# Patient Record
Sex: MALE | Race: WHITE | NOT HISPANIC OR LATINO | Employment: OTHER | URBAN - METROPOLITAN AREA
[De-identification: names, ages, dates, MRNs, and addresses within clinical notes are randomized per-mention and may not be internally consistent; named-entity substitution may affect disease eponyms.]

---

## 2024-07-09 ENCOUNTER — APPOINTMENT (OUTPATIENT)
Dept: GENERAL RADIOLOGY | Facility: HOSPITAL | Age: 49
End: 2024-07-09
Payer: COMMERCIAL

## 2024-07-09 ENCOUNTER — HOSPITAL ENCOUNTER (OUTPATIENT)
Facility: HOSPITAL | Age: 49
Setting detail: OBSERVATION
Discharge: HOME | End: 2024-07-10
Attending: EMERGENCY MEDICINE | Admitting: EMERGENCY MEDICINE
Payer: COMMERCIAL

## 2024-07-09 DIAGNOSIS — R79.82 ELEVATED C-REACTIVE PROTEIN (CRP): ICD-10-CM

## 2024-07-09 DIAGNOSIS — E11.9 TYPE 2 DIABETES MELLITUS WITHOUT COMPLICATION, UNSPECIFIED WHETHER LONG TERM INSULIN USE: ICD-10-CM

## 2024-07-09 DIAGNOSIS — D72.829 LEUKOCYTOSIS, UNSPECIFIED TYPE: ICD-10-CM

## 2024-07-09 DIAGNOSIS — M71.50 TRAUMATIC BURSITIS: Primary | ICD-10-CM

## 2024-07-09 LAB
ALBUMIN SERPL-MCNC: 4.3 G/DL (ref 3.5–5.2)
ALBUMIN/GLOB SERPL: 1.5 G/DL
ALP SERPL-CCNC: 70 U/L (ref 39–117)
ALT SERPL W P-5'-P-CCNC: 14 U/L (ref 1–41)
ANION GAP SERPL CALCULATED.3IONS-SCNC: 12.3 MMOL/L (ref 5–15)
AST SERPL-CCNC: 15 U/L (ref 1–40)
BASOPHILS # BLD AUTO: 0.03 10*3/MM3 (ref 0–0.2)
BASOPHILS NFR BLD AUTO: 0.3 % (ref 0–1.5)
BILIRUB SERPL-MCNC: 1.4 MG/DL (ref 0–1.2)
BUN SERPL-MCNC: 18 MG/DL (ref 6–20)
BUN/CREAT SERPL: 25 (ref 7–25)
CALCIUM SPEC-SCNC: 8.9 MG/DL (ref 8.6–10.5)
CHLORIDE SERPL-SCNC: 101 MMOL/L (ref 98–107)
CO2 SERPL-SCNC: 22.7 MMOL/L (ref 22–29)
CREAT SERPL-MCNC: 0.72 MG/DL (ref 0.76–1.27)
CRP SERPL-MCNC: 0.84 MG/DL (ref 0–0.5)
D-LACTATE SERPL-SCNC: 1.3 MMOL/L (ref 0.5–2)
DEPRECATED RDW RBC AUTO: 40.6 FL (ref 37–54)
EGFRCR SERPLBLD CKD-EPI 2021: 113.4 ML/MIN/1.73
EOSINOPHIL # BLD AUTO: 0.18 10*3/MM3 (ref 0–0.4)
EOSINOPHIL NFR BLD AUTO: 1.5 % (ref 0.3–6.2)
ERYTHROCYTE [DISTWIDTH] IN BLOOD BY AUTOMATED COUNT: 12.3 % (ref 12.3–15.4)
ERYTHROCYTE [SEDIMENTATION RATE] IN BLOOD: 6 MM/HR (ref 0–15)
GLOBULIN UR ELPH-MCNC: 2.8 GM/DL
GLUCOSE SERPL-MCNC: 121 MG/DL (ref 65–99)
HCT VFR BLD AUTO: 41.7 % (ref 37.5–51)
HGB BLD-MCNC: 14.4 G/DL (ref 13–17.7)
IMM GRANULOCYTES # BLD AUTO: 0.02 10*3/MM3 (ref 0–0.05)
IMM GRANULOCYTES NFR BLD AUTO: 0.2 % (ref 0–0.5)
LYMPHOCYTES # BLD AUTO: 1.47 10*3/MM3 (ref 0.7–3.1)
LYMPHOCYTES NFR BLD AUTO: 12.6 % (ref 19.6–45.3)
MCH RBC QN AUTO: 31.7 PG (ref 26.6–33)
MCHC RBC AUTO-ENTMCNC: 34.5 G/DL (ref 31.5–35.7)
MCV RBC AUTO: 91.9 FL (ref 79–97)
MONOCYTES # BLD AUTO: 0.78 10*3/MM3 (ref 0.1–0.9)
MONOCYTES NFR BLD AUTO: 6.7 % (ref 5–12)
NEUTROPHILS NFR BLD AUTO: 78.7 % (ref 42.7–76)
NEUTROPHILS NFR BLD AUTO: 9.2 10*3/MM3 (ref 1.7–7)
NRBC BLD AUTO-RTO: 0 /100 WBC (ref 0–0.2)
PLATELET # BLD AUTO: 251 10*3/MM3 (ref 140–450)
PMV BLD AUTO: 8.9 FL (ref 6–12)
POTASSIUM SERPL-SCNC: 3.9 MMOL/L (ref 3.5–5.2)
PROCALCITONIN SERPL-MCNC: 0.04 NG/ML (ref 0–0.25)
PROT SERPL-MCNC: 7.1 G/DL (ref 6–8.5)
RBC # BLD AUTO: 4.54 10*6/MM3 (ref 4.14–5.8)
SODIUM SERPL-SCNC: 136 MMOL/L (ref 136–145)
WBC NRBC COR # BLD AUTO: 11.68 10*3/MM3 (ref 3.4–10.8)

## 2024-07-09 PROCEDURE — 96375 TX/PRO/DX INJ NEW DRUG ADDON: CPT

## 2024-07-09 PROCEDURE — 25010000002 ONDANSETRON PER 1 MG: Performed by: EMERGENCY MEDICINE

## 2024-07-09 PROCEDURE — 25010000002 MORPHINE PER 10 MG: Performed by: EMERGENCY MEDICINE

## 2024-07-09 PROCEDURE — 85652 RBC SED RATE AUTOMATED: CPT | Performed by: EMERGENCY MEDICINE

## 2024-07-09 PROCEDURE — 99285 EMERGENCY DEPT VISIT HI MDM: CPT

## 2024-07-09 PROCEDURE — 84550 ASSAY OF BLOOD/URIC ACID: CPT | Performed by: NURSE PRACTITIONER

## 2024-07-09 PROCEDURE — 83605 ASSAY OF LACTIC ACID: CPT | Performed by: EMERGENCY MEDICINE

## 2024-07-09 PROCEDURE — 86140 C-REACTIVE PROTEIN: CPT | Performed by: EMERGENCY MEDICINE

## 2024-07-09 PROCEDURE — 96365 THER/PROPH/DIAG IV INF INIT: CPT

## 2024-07-09 PROCEDURE — 84145 PROCALCITONIN (PCT): CPT | Performed by: EMERGENCY MEDICINE

## 2024-07-09 PROCEDURE — 73070 X-RAY EXAM OF ELBOW: CPT

## 2024-07-09 PROCEDURE — 85025 COMPLETE CBC W/AUTO DIFF WBC: CPT | Performed by: EMERGENCY MEDICINE

## 2024-07-09 PROCEDURE — 25010000002 AMPICILLIN-SULBACTAM PER 1.5 G: Performed by: EMERGENCY MEDICINE

## 2024-07-09 PROCEDURE — 25010000002 KETOROLAC TROMETHAMINE PER 15 MG: Performed by: EMERGENCY MEDICINE

## 2024-07-09 PROCEDURE — 80053 COMPREHEN METABOLIC PANEL: CPT | Performed by: EMERGENCY MEDICINE

## 2024-07-09 RX ORDER — ONDANSETRON 2 MG/ML
4 INJECTION INTRAMUSCULAR; INTRAVENOUS ONCE
Status: COMPLETED | OUTPATIENT
Start: 2024-07-09 | End: 2024-07-09

## 2024-07-09 RX ORDER — KETOROLAC TROMETHAMINE 15 MG/ML
15 INJECTION, SOLUTION INTRAMUSCULAR; INTRAVENOUS ONCE
Status: COMPLETED | OUTPATIENT
Start: 2024-07-09 | End: 2024-07-09

## 2024-07-09 RX ORDER — MORPHINE SULFATE 2 MG/ML
4 INJECTION, SOLUTION INTRAMUSCULAR; INTRAVENOUS ONCE
Status: COMPLETED | OUTPATIENT
Start: 2024-07-09 | End: 2024-07-09

## 2024-07-09 RX ADMIN — MORPHINE SULFATE 4 MG: 2 INJECTION, SOLUTION INTRAMUSCULAR; INTRAVENOUS at 23:26

## 2024-07-09 RX ADMIN — ONDANSETRON 4 MG: 2 INJECTION INTRAMUSCULAR; INTRAVENOUS at 23:27

## 2024-07-09 RX ADMIN — AMPICILLIN SODIUM AND SULBACTAM SODIUM 3 G: 2; 1 INJECTION, POWDER, FOR SOLUTION INTRAMUSCULAR; INTRAVENOUS at 23:27

## 2024-07-09 RX ADMIN — KETOROLAC TROMETHAMINE 15 MG: 15 INJECTION, SOLUTION INTRAMUSCULAR; INTRAVENOUS at 23:26

## 2024-07-10 VITALS
SYSTOLIC BLOOD PRESSURE: 127 MMHG | HEART RATE: 69 BPM | DIASTOLIC BLOOD PRESSURE: 73 MMHG | OXYGEN SATURATION: 94 % | TEMPERATURE: 98.4 F | WEIGHT: 247.7 LBS | BODY MASS INDEX: 32.83 KG/M2 | HEIGHT: 73 IN | RESPIRATION RATE: 18 BRPM

## 2024-07-10 PROBLEM — M25.522 LEFT ELBOW PAIN: Status: ACTIVE | Noted: 2024-07-10

## 2024-07-10 LAB
GLUCOSE BLDC GLUCOMTR-MCNC: 165 MG/DL (ref 70–130)
URATE SERPL-MCNC: 3.4 MG/DL (ref 3.4–7)

## 2024-07-10 PROCEDURE — 96366 THER/PROPH/DIAG IV INF ADDON: CPT

## 2024-07-10 PROCEDURE — 82948 REAGENT STRIP/BLOOD GLUCOSE: CPT

## 2024-07-10 PROCEDURE — G0378 HOSPITAL OBSERVATION PER HR: HCPCS

## 2024-07-10 PROCEDURE — 63710000001 INSULIN LISPRO (HUMAN) PER 5 UNITS: Performed by: STUDENT IN AN ORGANIZED HEALTH CARE EDUCATION/TRAINING PROGRAM

## 2024-07-10 PROCEDURE — 25010000002 AMPICILLIN-SULBACTAM PER 1.5 G: Performed by: EMERGENCY MEDICINE

## 2024-07-10 RX ORDER — SODIUM CHLORIDE 0.9 % (FLUSH) 0.9 %
10 SYRINGE (ML) INJECTION EVERY 12 HOURS SCHEDULED
Status: DISCONTINUED | OUTPATIENT
Start: 2024-07-10 | End: 2024-07-10 | Stop reason: HOSPADM

## 2024-07-10 RX ORDER — SODIUM CHLORIDE 9 MG/ML
40 INJECTION, SOLUTION INTRAVENOUS AS NEEDED
Status: DISCONTINUED | OUTPATIENT
Start: 2024-07-10 | End: 2024-07-10 | Stop reason: HOSPADM

## 2024-07-10 RX ORDER — INSULIN LISPRO 100 [IU]/ML
2-9 INJECTION, SOLUTION INTRAVENOUS; SUBCUTANEOUS
Status: DISCONTINUED | OUTPATIENT
Start: 2024-07-10 | End: 2024-07-10 | Stop reason: HOSPADM

## 2024-07-10 RX ORDER — CEPHALEXIN 500 MG/1
500 CAPSULE ORAL 3 TIMES DAILY
Qty: 21 CAPSULE | Refills: 0 | Status: SHIPPED | OUTPATIENT
Start: 2024-07-10 | End: 2024-07-15 | Stop reason: HOSPADM

## 2024-07-10 RX ORDER — ACETAMINOPHEN 325 MG/1
650 TABLET ORAL EVERY 4 HOURS PRN
Status: DISCONTINUED | OUTPATIENT
Start: 2024-07-10 | End: 2024-07-10 | Stop reason: HOSPADM

## 2024-07-10 RX ORDER — ATORVASTATIN CALCIUM 20 MG/1
20 TABLET, FILM COATED ORAL DAILY
Status: DISCONTINUED | OUTPATIENT
Start: 2024-07-10 | End: 2024-07-10 | Stop reason: HOSPADM

## 2024-07-10 RX ORDER — ONDANSETRON 2 MG/ML
4 INJECTION INTRAMUSCULAR; INTRAVENOUS EVERY 6 HOURS PRN
Status: DISCONTINUED | OUTPATIENT
Start: 2024-07-10 | End: 2024-07-10 | Stop reason: HOSPADM

## 2024-07-10 RX ORDER — SODIUM CHLORIDE 0.9 % (FLUSH) 0.9 %
10 SYRINGE (ML) INJECTION AS NEEDED
Status: DISCONTINUED | OUTPATIENT
Start: 2024-07-10 | End: 2024-07-10 | Stop reason: HOSPADM

## 2024-07-10 RX ORDER — HYDROCODONE BITARTRATE AND ACETAMINOPHEN 5; 325 MG/1; MG/1
1 TABLET ORAL EVERY 6 HOURS PRN
Qty: 12 TABLET | Refills: 0 | Status: SHIPPED | OUTPATIENT
Start: 2024-07-10 | End: 2024-07-15

## 2024-07-10 RX ORDER — ACETAMINOPHEN 160 MG/5ML
650 SOLUTION ORAL EVERY 4 HOURS PRN
Status: DISCONTINUED | OUTPATIENT
Start: 2024-07-10 | End: 2024-07-10 | Stop reason: HOSPADM

## 2024-07-10 RX ORDER — HYDROCODONE BITARTRATE AND ACETAMINOPHEN 5; 325 MG/1; MG/1
1 TABLET ORAL EVERY 6 HOURS PRN
Status: DISCONTINUED | OUTPATIENT
Start: 2024-07-10 | End: 2024-07-10 | Stop reason: HOSPADM

## 2024-07-10 RX ORDER — ONDANSETRON 4 MG/1
4 TABLET, ORALLY DISINTEGRATING ORAL EVERY 8 HOURS PRN
Qty: 30 TABLET | Refills: 0 | Status: SHIPPED | OUTPATIENT
Start: 2024-07-10

## 2024-07-10 RX ORDER — ACETAMINOPHEN 650 MG/1
650 SUPPOSITORY RECTAL EVERY 4 HOURS PRN
Status: DISCONTINUED | OUTPATIENT
Start: 2024-07-10 | End: 2024-07-10 | Stop reason: HOSPADM

## 2024-07-10 RX ORDER — NICOTINE POLACRILEX 4 MG
15 LOZENGE BUCCAL
Status: DISCONTINUED | OUTPATIENT
Start: 2024-07-10 | End: 2024-07-10 | Stop reason: HOSPADM

## 2024-07-10 RX ORDER — ATORVASTATIN CALCIUM 20 MG/1
20 TABLET, FILM COATED ORAL DAILY
COMMUNITY
Start: 2024-06-06

## 2024-07-10 RX ORDER — DEXTROSE MONOHYDRATE 25 G/50ML
25 INJECTION, SOLUTION INTRAVENOUS
Status: DISCONTINUED | OUTPATIENT
Start: 2024-07-10 | End: 2024-07-10 | Stop reason: HOSPADM

## 2024-07-10 RX ORDER — IBUPROFEN 600 MG/1
1 TABLET ORAL
Status: DISCONTINUED | OUTPATIENT
Start: 2024-07-10 | End: 2024-07-10 | Stop reason: HOSPADM

## 2024-07-10 RX ORDER — ONDANSETRON 4 MG/1
4 TABLET, ORALLY DISINTEGRATING ORAL EVERY 6 HOURS PRN
Status: DISCONTINUED | OUTPATIENT
Start: 2024-07-10 | End: 2024-07-10 | Stop reason: HOSPADM

## 2024-07-10 RX ADMIN — HYDROCODONE BITARTRATE AND ACETAMINOPHEN 1 TABLET: 5; 325 TABLET ORAL at 01:05

## 2024-07-10 RX ADMIN — Medication 10 ML: at 08:20

## 2024-07-10 RX ADMIN — Medication 10 ML: at 00:58

## 2024-07-10 RX ADMIN — AMPICILLIN SODIUM AND SULBACTAM SODIUM 3 G: 2; 1 INJECTION, POWDER, FOR SOLUTION INTRAMUSCULAR; INTRAVENOUS at 09:35

## 2024-07-10 RX ADMIN — AMPICILLIN SODIUM AND SULBACTAM SODIUM 3 G: 2; 1 INJECTION, POWDER, FOR SOLUTION INTRAMUSCULAR; INTRAVENOUS at 05:43

## 2024-07-10 RX ADMIN — ATORVASTATIN CALCIUM 20 MG: 20 TABLET, FILM COATED ORAL at 08:25
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
## 2024-07-10 NOTE — PROGRESS NOTES
ED OBSERVATION PROGRESS/DISCHARGE SUMMARY    Date of Admission: 7/9/2024   LOS: 0 days   PCP: Hilaria Velazquez APRN    Final Diagnosis traumatic bursitis      Subjective     Hospital Outcome:   Patient is a pleasant afebrile ambulatory 48-year-old  gentleman admitted to the ED observation unit for right elbow discomfort with extension and flexion status post fall 2 days ago.    Plain films obtained did not show any acute osseous findings.  Sed rate and uric acid level checked which was normal, as well as lactate and procalcitonin.  His CRP was slightly elevated at 0.84.  He was started on Unasyn for questionable concern that this could be related to septic joint versus traumatic bursitis.    Patient was seen and evaluated by Dr. Denson with orthopedic surgery team who recommends NSAID use like diclofenac, Keflex 500 3 times daily and follow-up in the office in the next 2 weeks with wearing of compression sleeve.  Given that we do not have any of these and how some Ace wrap was placed.    ROS:  General: no fevers, chills  Respiratory: no cough, dyspnea  Cardiovascular: no chest pain, palpitations  Abdomen: No abdominal pain, nausea, vomiting, or diarrhea  Neurologic: No focal weakness    Objective   Physical Exam:  I have reviewed the vital signs.  Temp:  [97.9 °F (36.6 °C)-98.3 °F (36.8 °C)] 97.9 °F (36.6 °C)  Heart Rate:  [67-77] 67  Resp:  [18-19] 18  BP: (124-154)/(67-96) 124/72  General Appearance:    Alert, cooperative, no distress  Head:    Normocephalic, atraumatic  Eyes:    Sclerae anicteric  Neck:   Supple, no mass  Lungs: Clear to auscultation bilaterally, respirations unlabored  Heart: Regular rate and rhythm, S1 and S2 normal, no murmur, rub or gallop  Abdomen:  Soft, non-tender, bowel sounds active, nondistended  Extremities: No clubbing, cyanosis, or edema to lower extremities, there is mild to moderate soft tissue swelling overlying the proximal right forearm with mild to moderate warmth, no  crepitus, normal active and passive range of motion.  No skin breakdown appreciated  Pulses:  2+ and symmetric in distal lower extremities  Skin: No rashes   Neurologic: Oriented x3, Normal strength to extremities    Results Review:    I have reviewed the labs, radiology results and diagnostic studies.    Results from last 7 days   Lab Units 07/09/24 2127   WBC 10*3/mm3 11.68*   HEMOGLOBIN g/dL 14.4   HEMATOCRIT % 41.7   PLATELETS 10*3/mm3 251     Results from last 7 days   Lab Units 07/09/24 2127   SODIUM mmol/L 136   POTASSIUM mmol/L 3.9   CHLORIDE mmol/L 101   CO2 mmol/L 22.7   BUN mg/dL 18   CREATININE mg/dL 0.72*   CALCIUM mg/dL 8.9   BILIRUBIN mg/dL 1.4*   ALK PHOS U/L 70   ALT (SGPT) U/L 14   AST (SGOT) U/L 15   GLUCOSE mg/dL 121*     Imaging Results (Last 24 Hours)       Procedure Component Value Units Date/Time    XR Elbow 2 View Right [[id number]] Collected: 07/09/24 2210     Updated: 07/09/24 2215    Narrative:      2 VIEWS RIGHT ELBOW     HISTORY: Swelling and elbow pain     COMPARISON: None available.     FINDINGS:  No acute fracture or subluxation of the right elbow is seen. There are  degenerative changes of the right elbow. There is no elbow effusion.  There is soft tissue swelling seen overlying the posterior aspect of the  elbow.       Impression:      No acute osseous findings.     This report was finalized on 7/9/2024 10:12 PM by Dr. Tahira Lee M.D on Workstation: [id number]               I have reviewed the medications.  ---------------------------------------------------------------------------------------------  Assessment & Plan   Assessment/Problem List    Left elbow pain      Plan:    Left elbow pain and swelling  -Plain films show no acute osseous findings, no elbow effusion, soft tissue swelling overlying the posterior aspect of the elbow  -CRP 0.84, ESR 6, WBC 11.68  -Patient afebrile  -Orthopedics consulted, clear for dc home on nsaid, keflex and compression sleeve       Type  2 diabetes  -Hold home metformin  -Sliding scale insulin Accu-Cheks with meals and at bedtime     Hyperlipidemia   -Continue atorvastatin    Disposition: Home    Follow-up after Discharge: PCP & Ortho    This note will serve as a discharge summary    PHILOMENA Graves 07/10/24 07:12 EDT    I have worn appropriate PPE during this patient encounter, sanitized my hands both with entering and exiting patient's room.      40 minutes has been spent by [facility] providers in the care of this patient while under observation status
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
## 2024-07-10 NOTE — PROGRESS NOTES
Pt admitted to the observation unit from the emergency department with traumatic bursitis with concerns for possible underlying septic bursitis, admitted for IV antibiotics and orthopedic consult.  Today pain is improving with improved range of motion.      On exam,   General: No acute distress, nontoxic  HEENT: EOMI  Pulm: Symmetric chest rise, nonlabored breathing  CV: Regular rate and rhythm  GI: Nondistended  MSK: Right olecranon with edema and erythema with range of motion mostly intact, slightly tender to touch.  No purulent drainage.  Skin: Warm, dry  Neuro: Awake, alert, oriented x 4, moving all extremities, no focal deficits  Psych: Calm, cooperative    Vital signs and nursing notes reviewed.           Plan: Orthopedics Dr. Denson has evaluated, will send home on diclofenac and antibiotics and I will follow-up in the outpatient setting in 1 to 2 weeks.  I recommended compression to the elbow.  I have discussed limiting direct trauma to the area and gradual increase activity as tolerated but not to overdo with heavy forceful flexion or extension to the elbow joint.  Again he is improving symptomatically and I will discharge later this morning after a final dose of IV antibiotics.         MD Attestation Note    SHARED VISIT: This visit was performed by BOTH a physician and an APC. The substantive portion of the medical decision making was performed by this attesting physician who made or approved the management plan and takes responsibility for patient management. All studies in the APC note (if performed) were independently interpreted by me.

## 2024-07-13 ENCOUNTER — HOSPITAL ENCOUNTER (INPATIENT)
Facility: HOSPITAL | Age: 49
LOS: 2 days | Discharge: HOME | End: 2024-07-15
Attending: EMERGENCY MEDICINE | Admitting: HOSPITALIST
Payer: COMMERCIAL

## 2024-07-13 ENCOUNTER — APPOINTMENT (OUTPATIENT)
Dept: GENERAL RADIOLOGY | Facility: HOSPITAL | Age: 49
End: 2024-07-13
Payer: COMMERCIAL

## 2024-07-13 DIAGNOSIS — Z78.9 FAILURE OF OUTPATIENT TREATMENT: ICD-10-CM

## 2024-07-13 DIAGNOSIS — E11.65 UNCONTROLLED TYPE 2 DIABETES MELLITUS WITH HYPERGLYCEMIA: ICD-10-CM

## 2024-07-13 DIAGNOSIS — A41.9 SEPSIS, DUE TO UNSPECIFIED ORGANISM, UNSPECIFIED WHETHER ACUTE ORGAN DYSFUNCTION PRESENT: ICD-10-CM

## 2024-07-13 DIAGNOSIS — M25.521 RIGHT ELBOW PAIN: ICD-10-CM

## 2024-07-13 DIAGNOSIS — L03.113 CELLULITIS OF RIGHT FOREARM: Primary | ICD-10-CM

## 2024-07-13 LAB
ANION GAP SERPL CALCULATED.3IONS-SCNC: 9.7 MMOL/L (ref 5–15)
BASOPHILS # BLD AUTO: 0.03 10*3/MM3 (ref 0–0.2)
BASOPHILS NFR BLD AUTO: 0.4 % (ref 0–1.5)
BUN SERPL-MCNC: 11 MG/DL (ref 6–20)
BUN/CREAT SERPL: 14.9 (ref 7–25)
CALCIUM SPEC-SCNC: 9 MG/DL (ref 8.6–10.5)
CHLORIDE SERPL-SCNC: 102 MMOL/L (ref 98–107)
CO2 SERPL-SCNC: 23.3 MMOL/L (ref 22–29)
CREAT SERPL-MCNC: 0.74 MG/DL (ref 0.76–1.27)
CRP SERPL-MCNC: 8.68 MG/DL (ref 0–0.5)
D DIMER PPP FEU-MCNC: 0.29 MCGFEU/ML (ref 0–0.5)
D-LACTATE SERPL-SCNC: 1.2 MMOL/L (ref 0.5–2)
D-LACTATE SERPL-SCNC: 2.2 MMOL/L (ref 0.5–2)
DEPRECATED RDW RBC AUTO: 41.2 FL (ref 37–54)
EGFRCR SERPLBLD CKD-EPI 2021: 112.5 ML/MIN/1.73
EOSINOPHIL # BLD AUTO: 0.2 10*3/MM3 (ref 0–0.4)
EOSINOPHIL NFR BLD AUTO: 2.5 % (ref 0.3–6.2)
ERYTHROCYTE [DISTWIDTH] IN BLOOD BY AUTOMATED COUNT: 11.9 % (ref 12.3–15.4)
ERYTHROCYTE [SEDIMENTATION RATE] IN BLOOD: 44 MM/HR (ref 0–15)
GLUCOSE BLDC GLUCOMTR-MCNC: 142 MG/DL (ref 70–130)
GLUCOSE BLDC GLUCOMTR-MCNC: 192 MG/DL (ref 70–130)
GLUCOSE SERPL-MCNC: 293 MG/DL (ref 65–99)
HBA1C MFR BLD: 6.5 % (ref 4.8–5.6)
HCT VFR BLD AUTO: 39.2 % (ref 37.5–51)
HGB BLD-MCNC: 13.2 G/DL (ref 13–17.7)
IMM GRANULOCYTES # BLD AUTO: 0.02 10*3/MM3 (ref 0–0.05)
IMM GRANULOCYTES NFR BLD AUTO: 0.2 % (ref 0–0.5)
LYMPHOCYTES # BLD AUTO: 0.81 10*3/MM3 (ref 0.7–3.1)
LYMPHOCYTES NFR BLD AUTO: 10.1 % (ref 19.6–45.3)
MAGNESIUM SERPL-MCNC: 2 MG/DL (ref 1.6–2.6)
MCH RBC QN AUTO: 31.6 PG (ref 26.6–33)
MCHC RBC AUTO-ENTMCNC: 33.7 G/DL (ref 31.5–35.7)
MCV RBC AUTO: 93.8 FL (ref 79–97)
MONOCYTES # BLD AUTO: 0.52 10*3/MM3 (ref 0.1–0.9)
MONOCYTES NFR BLD AUTO: 6.5 % (ref 5–12)
NEUTROPHILS NFR BLD AUTO: 6.45 10*3/MM3 (ref 1.7–7)
NEUTROPHILS NFR BLD AUTO: 80.3 % (ref 42.7–76)
NRBC BLD AUTO-RTO: 0 /100 WBC (ref 0–0.2)
PLATELET # BLD AUTO: 214 10*3/MM3 (ref 140–450)
PMV BLD AUTO: 8.9 FL (ref 6–12)
POTASSIUM SERPL-SCNC: 4 MMOL/L (ref 3.5–5.2)
PROCALCITONIN SERPL-MCNC: 0.07 NG/ML (ref 0–0.25)
RBC # BLD AUTO: 4.18 10*6/MM3 (ref 4.14–5.8)
SODIUM SERPL-SCNC: 135 MMOL/L (ref 136–145)
WBC NRBC COR # BLD AUTO: 8.03 10*3/MM3 (ref 3.4–10.8)

## 2024-07-13 PROCEDURE — 83036 HEMOGLOBIN GLYCOSYLATED A1C: CPT | Performed by: HOSPITALIST

## 2024-07-13 PROCEDURE — 99291 CRITICAL CARE FIRST HOUR: CPT

## 2024-07-13 PROCEDURE — 73070 X-RAY EXAM OF ELBOW: CPT

## 2024-07-13 PROCEDURE — 25010000002 VANCOMYCIN 10 G RECONSTITUTED SOLUTION: Performed by: EMERGENCY MEDICINE

## 2024-07-13 PROCEDURE — 85379 FIBRIN DEGRADATION QUANT: CPT | Performed by: EMERGENCY MEDICINE

## 2024-07-13 PROCEDURE — 84145 PROCALCITONIN (PCT): CPT | Performed by: EMERGENCY MEDICINE

## 2024-07-13 PROCEDURE — 25010000002 VANCOMYCIN HCL 1.25 G RECONSTITUTED SOLUTION 1 EACH VIAL: Performed by: ORTHOPAEDIC SURGERY

## 2024-07-13 PROCEDURE — 36415 COLL VENOUS BLD VENIPUNCTURE: CPT

## 2024-07-13 PROCEDURE — 86140 C-REACTIVE PROTEIN: CPT | Performed by: EMERGENCY MEDICINE

## 2024-07-13 PROCEDURE — 85025 COMPLETE CBC W/AUTO DIFF WBC: CPT | Performed by: EMERGENCY MEDICINE

## 2024-07-13 PROCEDURE — 85652 RBC SED RATE AUTOMATED: CPT | Performed by: EMERGENCY MEDICINE

## 2024-07-13 PROCEDURE — 25810000003 SODIUM CHLORIDE 0.9 % SOLUTION 250 ML FLEX CONT: Performed by: ORTHOPAEDIC SURGERY

## 2024-07-13 PROCEDURE — 82948 REAGENT STRIP/BLOOD GLUCOSE: CPT

## 2024-07-13 PROCEDURE — 83605 ASSAY OF LACTIC ACID: CPT | Performed by: EMERGENCY MEDICINE

## 2024-07-13 PROCEDURE — 25810000003 SODIUM CHLORIDE 0.9 % SOLUTION: Performed by: EMERGENCY MEDICINE

## 2024-07-13 PROCEDURE — 83735 ASSAY OF MAGNESIUM: CPT | Performed by: EMERGENCY MEDICINE

## 2024-07-13 PROCEDURE — 25010000002 CEFTRIAXONE PER 250 MG: Performed by: HOSPITALIST

## 2024-07-13 PROCEDURE — 87040 BLOOD CULTURE FOR BACTERIA: CPT | Performed by: EMERGENCY MEDICINE

## 2024-07-13 PROCEDURE — 80048 BASIC METABOLIC PNL TOTAL CA: CPT | Performed by: EMERGENCY MEDICINE

## 2024-07-13 RX ORDER — IBUPROFEN 600 MG/1
1 TABLET ORAL
Status: DISCONTINUED | OUTPATIENT
Start: 2024-07-13 | End: 2024-07-15 | Stop reason: HOSPADM

## 2024-07-13 RX ORDER — HYDROCODONE BITARTRATE AND ACETAMINOPHEN 5; 325 MG/1; MG/1
1 TABLET ORAL EVERY 6 HOURS PRN
Status: DISCONTINUED | OUTPATIENT
Start: 2024-07-13 | End: 2024-07-14

## 2024-07-13 RX ORDER — BISACODYL 5 MG/1
5 TABLET, DELAYED RELEASE ORAL DAILY PRN
Status: DISCONTINUED | OUTPATIENT
Start: 2024-07-13 | End: 2024-07-15 | Stop reason: HOSPADM

## 2024-07-13 RX ORDER — AMOXICILLIN 250 MG
2 CAPSULE ORAL 2 TIMES DAILY PRN
Status: DISCONTINUED | OUTPATIENT
Start: 2024-07-13 | End: 2024-07-15 | Stop reason: HOSPADM

## 2024-07-13 RX ORDER — ATORVASTATIN CALCIUM 20 MG/1
20 TABLET, FILM COATED ORAL DAILY
Status: DISCONTINUED | OUTPATIENT
Start: 2024-07-13 | End: 2024-07-15 | Stop reason: HOSPADM

## 2024-07-13 RX ORDER — ACETAMINOPHEN 160 MG/5ML
650 SOLUTION ORAL EVERY 4 HOURS PRN
Status: DISCONTINUED | OUTPATIENT
Start: 2024-07-13 | End: 2024-07-15 | Stop reason: HOSPADM

## 2024-07-13 RX ORDER — NICOTINE POLACRILEX 4 MG
15 LOZENGE BUCCAL
Status: DISCONTINUED | OUTPATIENT
Start: 2024-07-13 | End: 2024-07-15 | Stop reason: HOSPADM

## 2024-07-13 RX ORDER — OXYCODONE HYDROCHLORIDE AND ACETAMINOPHEN 5; 325 MG/1; MG/1
1 TABLET ORAL ONCE
Status: COMPLETED | OUTPATIENT
Start: 2024-07-13 | End: 2024-07-13

## 2024-07-13 RX ORDER — INSULIN LISPRO 100 [IU]/ML
2-9 INJECTION, SOLUTION INTRAVENOUS; SUBCUTANEOUS
Status: DISCONTINUED | OUTPATIENT
Start: 2024-07-13 | End: 2024-07-15 | Stop reason: HOSPADM

## 2024-07-13 RX ORDER — SODIUM CHLORIDE 0.9 % (FLUSH) 0.9 %
10 SYRINGE (ML) INJECTION AS NEEDED
Status: DISCONTINUED | OUTPATIENT
Start: 2024-07-13 | End: 2024-07-15 | Stop reason: HOSPADM

## 2024-07-13 RX ORDER — POLYETHYLENE GLYCOL 3350 17 G/17G
17 POWDER, FOR SOLUTION ORAL DAILY PRN
Status: DISCONTINUED | OUTPATIENT
Start: 2024-07-13 | End: 2024-07-15 | Stop reason: HOSPADM

## 2024-07-13 RX ORDER — BISACODYL 10 MG
10 SUPPOSITORY, RECTAL RECTAL DAILY PRN
Status: DISCONTINUED | OUTPATIENT
Start: 2024-07-13 | End: 2024-07-15 | Stop reason: HOSPADM

## 2024-07-13 RX ORDER — ONDANSETRON 4 MG/1
4 TABLET, ORALLY DISINTEGRATING ORAL EVERY 6 HOURS PRN
Status: DISCONTINUED | OUTPATIENT
Start: 2024-07-13 | End: 2024-07-15 | Stop reason: HOSPADM

## 2024-07-13 RX ORDER — ONDANSETRON 2 MG/ML
4 INJECTION INTRAMUSCULAR; INTRAVENOUS EVERY 6 HOURS PRN
Status: DISCONTINUED | OUTPATIENT
Start: 2024-07-13 | End: 2024-07-15 | Stop reason: HOSPADM

## 2024-07-13 RX ORDER — ACETAMINOPHEN 325 MG/1
650 TABLET ORAL EVERY 4 HOURS PRN
Status: DISCONTINUED | OUTPATIENT
Start: 2024-07-13 | End: 2024-07-15 | Stop reason: HOSPADM

## 2024-07-13 RX ORDER — SODIUM CHLORIDE 0.9 % (FLUSH) 0.9 %
10 SYRINGE (ML) INJECTION EVERY 12 HOURS SCHEDULED
Status: DISCONTINUED | OUTPATIENT
Start: 2024-07-13 | End: 2024-07-15 | Stop reason: HOSPADM

## 2024-07-13 RX ORDER — DEXTROSE MONOHYDRATE 25 G/50ML
25 INJECTION, SOLUTION INTRAVENOUS
Status: DISCONTINUED | OUTPATIENT
Start: 2024-07-13 | End: 2024-07-15 | Stop reason: HOSPADM

## 2024-07-13 RX ORDER — ACETAMINOPHEN 650 MG/1
650 SUPPOSITORY RECTAL EVERY 4 HOURS PRN
Status: DISCONTINUED | OUTPATIENT
Start: 2024-07-13 | End: 2024-07-15 | Stop reason: HOSPADM

## 2024-07-13 RX ORDER — SODIUM CHLORIDE 9 MG/ML
40 INJECTION, SOLUTION INTRAVENOUS AS NEEDED
Status: DISCONTINUED | OUTPATIENT
Start: 2024-07-13 | End: 2024-07-15 | Stop reason: HOSPADM

## 2024-07-13 RX ADMIN — VANCOMYCIN HYDROCHLORIDE 2250 MG: 10 INJECTION, POWDER, LYOPHILIZED, FOR SOLUTION INTRAVENOUS at 11:56

## 2024-07-13 RX ADMIN — SODIUM CHLORIDE 1000 ML: 9 INJECTION, SOLUTION INTRAVENOUS at 11:57

## 2024-07-13 RX ADMIN — ATORVASTATIN CALCIUM 20 MG: 20 TABLET, FILM COATED ORAL at 16:01

## 2024-07-13 RX ADMIN — SODIUM CHLORIDE 1250 MG: 9 INJECTION, SOLUTION INTRAVENOUS at 22:02

## 2024-07-13 RX ADMIN — Medication 10 ML: at 22:02

## 2024-07-13 RX ADMIN — OXYCODONE HYDROCHLORIDE AND ACETAMINOPHEN 1 TABLET: 5; 325 TABLET ORAL at 12:02

## 2024-07-13 RX ADMIN — HYDROCODONE BITARTRATE AND ACETAMINOPHEN 1 TABLET: 5; 325 TABLET ORAL at 21:53

## 2024-07-13 RX ADMIN — CEFTRIAXONE 2000 MG: 2 INJECTION, POWDER, FOR SOLUTION INTRAMUSCULAR; INTRAVENOUS at 16:02

## 2024-07-13 RX ADMIN — HYDROCODONE BITARTRATE AND ACETAMINOPHEN 1 TABLET: 5; 325 TABLET ORAL at 16:02
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
## 2024-07-13 NOTE — PROGRESS NOTES
"[facility] Clinical Pharmacy Services: Vancomycin Pharmacokinetic Initial Consult Note    Jignesh Kaur is a 48 y.o. male who is on day 1 of pharmacy to dose vancomycin.    Indication: Skin and Soft Tissue  Consulting Provider: Dr. Motley  Planned Duration of Therapy: 5 days  Loading Dose Ordered or Given: 2250mg once   MRSA PCR performed: ; Result:   Culture/Source: SSTI   Target: -600 mg/L.hr   Pertinent Vanc Dosing History:   Other Antimicrobials: ceftriaxone    Vitals/Labs  Ht: 185.4 cm (73\"); Wt: 112 kg (247 lb)  Temp Readings from Last 1 Encounters:   07/13/24 97.1 °F (36.2 °C) (Oral)    Estimated Creatinine Clearance: 161.8 mL/min (A) (by C-G formula based on SCr of 0.74 mg/dL (L)).        Results from last 7 days   Lab Units 07/13/24  1015 07/09/24  2127   CREATININE mg/dL 0.74* 0.72*   WBC 10*3/mm3 8.03 11.68*     Assessment/Plan:    Vancomycin Dose:   1250 mg IV every  12  hours  Predictive AUC level for the dose ordered is 461 mg/L.hr, which is within the target of 400-600 mg/L.hr  Vanc Trough has been ordered for 7/15  at 1030     Pharmacy will follow patient's kidney function and will adjust doses and obtain levels as necessary. Thank you for involving pharmacy in this patient's care. Please contact pharmacy with any questions or concerns.                           Allison Mckenzie, PharmD  Clinical Pharmacist    "

## 2024-07-13 NOTE — PLAN OF CARE
Problem: Adult Inpatient Plan of Care  Goal: Plan of Care Review  Outcome: Ongoing, Progressing  Flowsheets (Taken 7/13/2024 1826)  Progress: no change  Plan of Care Reviewed With: patient  Outcome Evaluation: Patient arrived from ED with cellulitis of his Right arm going from elbow down to his forearm. Pt got a Liter Bolus, vancomycin. And pain meds. On the floor pt received norco for pain x1. Up ad mary. RA. C/C diet with ACHS. Possible I&D. blood cultures drawn. and pt is + sepsis. Will continue to treat per MD     Problem: Adult Inpatient Plan of Care  Goal: Patient-Specific Goal (Individualized)  Outcome: Ongoing, Progressing  Flowsheets (Taken 7/13/2024 1826)  Patient-Specific Goals (Include Timeframe): to feel better  Individualized Care Needs: PRN and scheduled meds, IV antibotics, up ad mary, C/C diet
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
## 2024-07-14 PROBLEM — E11.9 TYPE 2 DIABETES MELLITUS, WITHOUT LONG-TERM CURRENT USE OF INSULIN: Status: ACTIVE | Noted: 2024-07-14

## 2024-07-14 PROBLEM — M71.121 SEPTIC OLECRANON BURSITIS OF RIGHT ELBOW: Status: ACTIVE | Noted: 2024-07-14

## 2024-07-14 PROBLEM — E78.5 HLD (HYPERLIPIDEMIA): Status: ACTIVE | Noted: 2024-07-14

## 2024-07-14 LAB
ANION GAP SERPL CALCULATED.3IONS-SCNC: 11 MMOL/L (ref 5–15)
BASOPHILS # BLD AUTO: 0.02 10*3/MM3 (ref 0–0.2)
BASOPHILS NFR BLD AUTO: 0.4 % (ref 0–1.5)
BUN SERPL-MCNC: 11 MG/DL (ref 6–20)
BUN/CREAT SERPL: 19 (ref 7–25)
CALCIUM SPEC-SCNC: 8.4 MG/DL (ref 8.6–10.5)
CHLORIDE SERPL-SCNC: 105 MMOL/L (ref 98–107)
CO2 SERPL-SCNC: 23 MMOL/L (ref 22–29)
CREAT SERPL-MCNC: 0.58 MG/DL (ref 0.76–1.27)
DEPRECATED RDW RBC AUTO: 39.2 FL (ref 37–54)
EGFRCR SERPLBLD CKD-EPI 2021: 121.1 ML/MIN/1.73
EOSINOPHIL # BLD AUTO: 0.22 10*3/MM3 (ref 0–0.4)
EOSINOPHIL NFR BLD AUTO: 4 % (ref 0.3–6.2)
ERYTHROCYTE [DISTWIDTH] IN BLOOD BY AUTOMATED COUNT: 11.8 % (ref 12.3–15.4)
GLUCOSE BLDC GLUCOMTR-MCNC: 131 MG/DL (ref 70–130)
GLUCOSE BLDC GLUCOMTR-MCNC: 147 MG/DL (ref 70–130)
GLUCOSE BLDC GLUCOMTR-MCNC: 165 MG/DL (ref 70–130)
GLUCOSE BLDC GLUCOMTR-MCNC: 186 MG/DL (ref 70–130)
GLUCOSE SERPL-MCNC: 208 MG/DL (ref 65–99)
HCT VFR BLD AUTO: 34.8 % (ref 37.5–51)
HGB BLD-MCNC: 11.8 G/DL (ref 13–17.7)
IMM GRANULOCYTES # BLD AUTO: 0.02 10*3/MM3 (ref 0–0.05)
IMM GRANULOCYTES NFR BLD AUTO: 0.4 % (ref 0–0.5)
LYMPHOCYTES # BLD AUTO: 0.99 10*3/MM3 (ref 0.7–3.1)
LYMPHOCYTES NFR BLD AUTO: 18 % (ref 19.6–45.3)
MCH RBC QN AUTO: 31 PG (ref 26.6–33)
MCHC RBC AUTO-ENTMCNC: 33.9 G/DL (ref 31.5–35.7)
MCV RBC AUTO: 91.3 FL (ref 79–97)
MONOCYTES # BLD AUTO: 0.46 10*3/MM3 (ref 0.1–0.9)
MONOCYTES NFR BLD AUTO: 8.3 % (ref 5–12)
MRSA DNA SPEC QL NAA+PROBE: NORMAL
NEUTROPHILS NFR BLD AUTO: 3.8 10*3/MM3 (ref 1.7–7)
NEUTROPHILS NFR BLD AUTO: 68.9 % (ref 42.7–76)
NRBC BLD AUTO-RTO: 0 /100 WBC (ref 0–0.2)
PLATELET # BLD AUTO: 192 10*3/MM3 (ref 140–450)
PMV BLD AUTO: 9.4 FL (ref 6–12)
POTASSIUM SERPL-SCNC: 3.9 MMOL/L (ref 3.5–5.2)
RBC # BLD AUTO: 3.81 10*6/MM3 (ref 4.14–5.8)
SODIUM SERPL-SCNC: 139 MMOL/L (ref 136–145)
TSH SERPL DL<=0.05 MIU/L-ACNC: 2.69 UIU/ML (ref 0.27–4.2)
WBC NRBC COR # BLD AUTO: 5.51 10*3/MM3 (ref 3.4–10.8)

## 2024-07-14 PROCEDURE — 25810000003 SODIUM CHLORIDE 0.9 % SOLUTION 250 ML FLEX CONT: Performed by: ORTHOPAEDIC SURGERY

## 2024-07-14 PROCEDURE — 85025 COMPLETE CBC W/AUTO DIFF WBC: CPT | Performed by: HOSPITALIST

## 2024-07-14 PROCEDURE — 25010000002 VANCOMYCIN HCL 1.25 G RECONSTITUTED SOLUTION 1 EACH VIAL: Performed by: ORTHOPAEDIC SURGERY

## 2024-07-14 PROCEDURE — 99253 IP/OBS CNSLTJ NEW/EST LOW 45: CPT | Performed by: ORTHOPAEDIC SURGERY

## 2024-07-14 PROCEDURE — 84443 ASSAY THYROID STIM HORMONE: CPT | Performed by: HOSPITALIST

## 2024-07-14 PROCEDURE — 99223 1ST HOSP IP/OBS HIGH 75: CPT | Performed by: INTERNAL MEDICINE

## 2024-07-14 PROCEDURE — 87641 MR-STAPH DNA AMP PROBE: CPT | Performed by: HOSPITALIST

## 2024-07-14 PROCEDURE — 82948 REAGENT STRIP/BLOOD GLUCOSE: CPT

## 2024-07-14 PROCEDURE — 25010000002 CEFTRIAXONE PER 250 MG: Performed by: HOSPITALIST

## 2024-07-14 PROCEDURE — 63710000001 INSULIN LISPRO (HUMAN) PER 5 UNITS: Performed by: HOSPITALIST

## 2024-07-14 PROCEDURE — 80048 BASIC METABOLIC PNL TOTAL CA: CPT | Performed by: HOSPITALIST

## 2024-07-14 RX ORDER — OXYCODONE HYDROCHLORIDE AND ACETAMINOPHEN 5; 325 MG/1; MG/1
1 TABLET ORAL EVERY 4 HOURS PRN
Status: DISCONTINUED | OUTPATIENT
Start: 2024-07-14 | End: 2024-07-15 | Stop reason: HOSPADM

## 2024-07-14 RX ORDER — OXYCODONE HYDROCHLORIDE AND ACETAMINOPHEN 5; 325 MG/1; MG/1
1 TABLET ORAL EVERY 6 HOURS PRN
Status: DISCONTINUED | OUTPATIENT
Start: 2024-07-14 | End: 2024-07-14

## 2024-07-14 RX ADMIN — CEFTRIAXONE 2000 MG: 2 INJECTION, POWDER, FOR SOLUTION INTRAMUSCULAR; INTRAVENOUS at 16:33

## 2024-07-14 RX ADMIN — SODIUM CHLORIDE 1250 MG: 9 INJECTION, SOLUTION INTRAVENOUS at 11:38

## 2024-07-14 RX ADMIN — ATORVASTATIN CALCIUM 20 MG: 20 TABLET, FILM COATED ORAL at 08:22

## 2024-07-14 RX ADMIN — ACETAMINOPHEN 325MG 650 MG: 325 TABLET ORAL at 00:44

## 2024-07-14 RX ADMIN — INSULIN LISPRO 2 UNITS: 100 INJECTION, SOLUTION INTRAVENOUS; SUBCUTANEOUS at 17:52

## 2024-07-14 RX ADMIN — Medication 10 ML: at 08:27

## 2024-07-14 RX ADMIN — OXYCODONE HYDROCHLORIDE AND ACETAMINOPHEN 1 TABLET: 5; 325 TABLET ORAL at 20:47

## 2024-07-14 RX ADMIN — OXYCODONE HYDROCHLORIDE AND ACETAMINOPHEN 1 TABLET: 5; 325 TABLET ORAL at 03:58

## 2024-07-14 RX ADMIN — OXYCODONE HYDROCHLORIDE AND ACETAMINOPHEN 1 TABLET: 5; 325 TABLET ORAL at 09:34

## 2024-07-14 RX ADMIN — OXYCODONE HYDROCHLORIDE AND ACETAMINOPHEN 1 TABLET: 5; 325 TABLET ORAL at 14:11

## 2024-07-14 RX ADMIN — SODIUM CHLORIDE 1250 MG: 9 INJECTION, SOLUTION INTRAVENOUS at 22:13

## 2024-07-14 RX ADMIN — INSULIN LISPRO 2 UNITS: 100 INJECTION, SOLUTION INTRAVENOUS; SUBCUTANEOUS at 08:22

## 2024-07-14 RX ADMIN — ACETAMINOPHEN 325MG 650 MG: 325 TABLET ORAL at 06:30

## 2024-07-14 NOTE — PLAN OF CARE
Goal Outcome Evaluation:  Plan of Care Reviewed With: patient        Progress: improving  Outcome Evaluation: VSS on RA. AOx4. IV ABX. RUE warm, swollen, and red, encouraged pt to keep elevated. PRN Percocet x2. MRSA nares neg. Up ad mary.

## 2024-07-14 NOTE — PLAN OF CARE
Goal Outcome Evaluation:   Patient alert and oriented. Access and drains include: IV. PRNs utilized during shift: 5mg norco PO x1, 5mg percocet PO x1, tylenol 650mg PO x1. No complaints of nausea during shift. No acute adverse events during shift. Plan of care ongoing.         Progress: no change
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
## 2024-07-15 VITALS
SYSTOLIC BLOOD PRESSURE: 156 MMHG | RESPIRATION RATE: 16 BRPM | HEART RATE: 68 BPM | BODY MASS INDEX: 32.74 KG/M2 | DIASTOLIC BLOOD PRESSURE: 91 MMHG | HEIGHT: 73 IN | TEMPERATURE: 98.5 F | OXYGEN SATURATION: 100 % | WEIGHT: 247 LBS

## 2024-07-15 LAB
ALBUMIN SERPL-MCNC: 3.3 G/DL (ref 3.5–5.2)
ALBUMIN/GLOB SERPL: 1.1 G/DL
ALP SERPL-CCNC: 65 U/L (ref 39–117)
ALT SERPL W P-5'-P-CCNC: 10 U/L (ref 1–41)
ANION GAP SERPL CALCULATED.3IONS-SCNC: 10.9 MMOL/L (ref 5–15)
AST SERPL-CCNC: 9 U/L (ref 1–40)
BILIRUB SERPL-MCNC: 0.4 MG/DL (ref 0–1.2)
BUN SERPL-MCNC: 12 MG/DL (ref 6–20)
BUN/CREAT SERPL: 20.3 (ref 7–25)
CALCIUM SPEC-SCNC: 8.3 MG/DL (ref 8.6–10.5)
CHLORIDE SERPL-SCNC: 104 MMOL/L (ref 98–107)
CO2 SERPL-SCNC: 23.1 MMOL/L (ref 22–29)
CREAT SERPL-MCNC: 0.59 MG/DL (ref 0.76–1.27)
CRP SERPL-MCNC: 3.33 MG/DL (ref 0–0.5)
DEPRECATED RDW RBC AUTO: 38.4 FL (ref 37–54)
EGFRCR SERPLBLD CKD-EPI 2021: 120.4 ML/MIN/1.73
ERYTHROCYTE [DISTWIDTH] IN BLOOD BY AUTOMATED COUNT: 11.7 % (ref 12.3–15.4)
GLOBULIN UR ELPH-MCNC: 2.9 GM/DL
GLUCOSE BLDC GLUCOMTR-MCNC: 134 MG/DL (ref 70–130)
GLUCOSE BLDC GLUCOMTR-MCNC: 145 MG/DL (ref 70–130)
GLUCOSE BLDC GLUCOMTR-MCNC: 156 MG/DL (ref 70–130)
GLUCOSE SERPL-MCNC: 191 MG/DL (ref 65–99)
HCT VFR BLD AUTO: 35.5 % (ref 37.5–51)
HGB BLD-MCNC: 12.1 G/DL (ref 13–17.7)
MAGNESIUM SERPL-MCNC: 2.3 MG/DL (ref 1.6–2.6)
MCH RBC QN AUTO: 31.2 PG (ref 26.6–33)
MCHC RBC AUTO-ENTMCNC: 34.1 G/DL (ref 31.5–35.7)
MCV RBC AUTO: 91.5 FL (ref 79–97)
PHOSPHATE SERPL-MCNC: 3.6 MG/DL (ref 2.5–4.5)
PLATELET # BLD AUTO: 204 10*3/MM3 (ref 140–450)
PMV BLD AUTO: 9.3 FL (ref 6–12)
POTASSIUM SERPL-SCNC: 4 MMOL/L (ref 3.5–5.2)
PROT SERPL-MCNC: 6.2 G/DL (ref 6–8.5)
RBC # BLD AUTO: 3.88 10*6/MM3 (ref 4.14–5.8)
SODIUM SERPL-SCNC: 138 MMOL/L (ref 136–145)
URATE SERPL-MCNC: 3 MG/DL (ref 3.4–7)
VANCOMYCIN TROUGH SERPL-MCNC: 6.3 MCG/ML (ref 5–20)
WBC NRBC COR # BLD AUTO: 5.97 10*3/MM3 (ref 3.4–10.8)

## 2024-07-15 PROCEDURE — 80202 ASSAY OF VANCOMYCIN: CPT | Performed by: ORTHOPAEDIC SURGERY

## 2024-07-15 PROCEDURE — 86140 C-REACTIVE PROTEIN: CPT | Performed by: HOSPITALIST

## 2024-07-15 PROCEDURE — 63710000001 INSULIN LISPRO (HUMAN) PER 5 UNITS: Performed by: HOSPITALIST

## 2024-07-15 PROCEDURE — 84550 ASSAY OF BLOOD/URIC ACID: CPT | Performed by: HOSPITALIST

## 2024-07-15 PROCEDURE — 80053 COMPREHEN METABOLIC PANEL: CPT | Performed by: HOSPITALIST

## 2024-07-15 PROCEDURE — 85027 COMPLETE CBC AUTOMATED: CPT | Performed by: HOSPITALIST

## 2024-07-15 PROCEDURE — 82948 REAGENT STRIP/BLOOD GLUCOSE: CPT

## 2024-07-15 PROCEDURE — 83735 ASSAY OF MAGNESIUM: CPT | Performed by: HOSPITALIST

## 2024-07-15 PROCEDURE — 99232 SBSQ HOSP IP/OBS MODERATE 35: CPT | Performed by: INTERNAL MEDICINE

## 2024-07-15 PROCEDURE — 84100 ASSAY OF PHOSPHORUS: CPT | Performed by: HOSPITALIST

## 2024-07-15 RX ORDER — SULFAMETHOXAZOLE AND TRIMETHOPRIM 800; 160 MG/1; MG/1
1 TABLET ORAL EVERY 12 HOURS SCHEDULED
Qty: 23 TABLET | Refills: 0 | Status: SHIPPED | OUTPATIENT
Start: 2024-07-15 | End: 2024-07-27

## 2024-07-15 RX ORDER — SULFAMETHOXAZOLE AND TRIMETHOPRIM 800; 160 MG/1; MG/1
1 TABLET ORAL EVERY 12 HOURS SCHEDULED
Status: DISCONTINUED | OUTPATIENT
Start: 2024-07-15 | End: 2024-07-15 | Stop reason: HOSPADM

## 2024-07-15 RX ORDER — DICLOXACILLIN SODIUM 250 MG/1
500 CAPSULE ORAL EVERY 6 HOURS SCHEDULED
Status: DISCONTINUED | OUTPATIENT
Start: 2024-07-15 | End: 2024-07-15 | Stop reason: HOSPADM

## 2024-07-15 RX ORDER — OXYCODONE HYDROCHLORIDE AND ACETAMINOPHEN 5; 325 MG/1; MG/1
1 TABLET ORAL EVERY 6 HOURS PRN
Qty: 12 TABLET | Refills: 0 | Status: SHIPPED | OUTPATIENT
Start: 2024-07-15

## 2024-07-15 RX ADMIN — Medication 10 ML: at 08:37

## 2024-07-15 RX ADMIN — SULFAMETHOXAZOLE AND TRIMETHOPRIM 1 TABLET: 800; 160 TABLET ORAL at 12:54

## 2024-07-15 RX ADMIN — INSULIN LISPRO 2 UNITS: 100 INJECTION, SOLUTION INTRAVENOUS; SUBCUTANEOUS at 08:34

## 2024-07-15 RX ADMIN — OXYCODONE HYDROCHLORIDE AND ACETAMINOPHEN 1 TABLET: 5; 325 TABLET ORAL at 08:36

## 2024-07-15 RX ADMIN — DICLOXACILLIN SODIUM 500 MG: 250 CAPSULE ORAL at 17:12

## 2024-07-15 RX ADMIN — OXYCODONE HYDROCHLORIDE AND ACETAMINOPHEN 1 TABLET: 5; 325 TABLET ORAL at 12:56

## 2024-07-15 RX ADMIN — OXYCODONE HYDROCHLORIDE AND ACETAMINOPHEN 1 TABLET: 5; 325 TABLET ORAL at 04:29

## 2024-07-15 RX ADMIN — DICLOXACILLIN SODIUM 500 MG: 250 CAPSULE ORAL at 12:54

## 2024-07-15 RX ADMIN — OXYCODONE HYDROCHLORIDE AND ACETAMINOPHEN 1 TABLET: 5; 325 TABLET ORAL at 00:46

## 2024-07-15 RX ADMIN — ATORVASTATIN CALCIUM 20 MG: 20 TABLET, FILM COATED ORAL at 08:34

## 2024-07-15 RX ADMIN — OXYCODONE HYDROCHLORIDE AND ACETAMINOPHEN 1 TABLET: 5; 325 TABLET ORAL at 17:12
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
## 2024-07-15 NOTE — PROGRESS NOTES
ID note for septic bursitis and cellulitis  Subjective: He is feeling better.  Pain is improved.  Afebrile.  Tolerating antibiotics.  Would like to go home.    Physical Exam:   Vital Signs   Temp:  [97.3 °F (36.3 °C)-98.1 °F (36.7 °C)] 97.5 °F (36.4 °C)  Heart Rate:  [58-68] 65  Resp:  [16-18] 16  BP: (124-135)/(75-86) 131/78    GENERAL: Awake and alert, in no acute distress.   HEENT: Oropharynx is clear. Hearing is grossly normal.   EYES: . No conjunctival injection. No lid lag.   LUNGS:normal respiratory effort.   SKIN: Right UE is improved with less swelling  PSYCHIATRIC: Appropriate mood, affect, insight, and judgment.     Results Review:  Blood cultures no growth to date  MRSA PCR negative    White count 5.97  Creatinine 0.59  Glucose 131 through 191    A/p  1.  Right upper extremity cellulitis/olecranon bursitis  2.  Diabetes mellitus type 2, continue glycemic control efforts to prevent/control infectious complications    Improving  Dc IV abx  Start dicloxacillin 500mg po q6 and bactrim ds po bid x12 more days  No objection to dc when okay with others

## 2024-07-15 NOTE — PLAN OF CARE
Goal Outcome Evaluation:  Plan of Care Reviewed With: patient        Progress: improving  Outcome Evaluation: Redness and swelling improving. Pain pills given as requested. IV abx given as ordered.

## 2024-07-15 NOTE — CONSULTS
Orthopaedic & Sports Medicine Surgery  Dr. Rojeloi Denson MD  [phone]    Orthopaedic Surgery  Consult Note      HPI:  Patient is a 48 y.o. male who presents with worsening right elbow and forearm cellulitis.  I saw him last week and he had some elbow cellulitis.  He was discharged on Keflex.  Over the next few days this got worse and he presented back to [facility].  Dr. Perez saw him on consult over the weekend and asked that I take over today.  He has been switched to dicloxacillin and Bactrim and responding well.    MEDICAL HISTORY  Past Medical History:   Diagnosis Date    Diabetes mellitus     Elevated cholesterol      Past Surgical History:   Procedure Laterality Date    REPLACEMENT TOTAL KNEE Left 2022     Prior to Admission medications    Medication Sig Start Date End Date Taking? Authorizing Provider   atorvastatin (LIPITOR) 20 MG tablet Take 1 tablet by mouth Daily. 6/6/24  Yes Zion Birmingham MD   Canagliflozin (INVOKANA) 100 MG tablet tablet Take 1 tablet by mouth Daily.   Yes Zion Birmingham MD   cephalexin (KEFLEX) 500 MG capsule Take 1 capsule by mouth 3 (Three) Times a Day for 7 days. 7/10/24 7/17/24 Yes Patria Grande APRN   dicloxacillin (DYNAPEN) 500 MG capsule Take 1 capsule by mouth 4 (Four) Times a Day for 12 days. Indications: Infection Under the Skin 7/15/24 7/27/24 Yes Jesus Manuel Negrete MD   HYDROcodone-acetaminophen (NORCO) 5-325 MG per tablet Take 1 tablet by mouth Every 6 (Six) Hours As Needed for Moderate Pain for up to 5 days. 7/10/24 7/15/24 Yes Patria Grande APRN   metFORMIN (GLUCOPHAGE) 1000 MG tablet Take 1 tablet by mouth 2 (Two) Times a Day With Meals. 6/27/24  Yes Zion Birmingham MD   oxyCODONE-acetaminophen (PERCOCET) 5-325 MG per tablet Take 1 tablet by mouth Every 6 (Six) Hours As Needed for Moderate Pain. 7/15/24  Yes Jesus Manuel Negrete MD   sulfamethoxazole-trimethoprim (BACTRIM DS,SEPTRA DS) 800-160 MG per tablet Take 1 tablet by mouth Every 12  "(Twelve) Hours for 23 doses. Indications: Infection of the Skin and/or Soft Tissue 7/15/24 7/27/24 Yes Jesus Manuel Negrete MD   ondansetron ODT (ZOFRAN-ODT) 4 MG disintegrating tablet Place 1 tablet on the tongue Every 8 (Eight) Hours As Needed for Nausea or Vomiting. 7/10/24   Patria Grande APRN     No Known Allergies    There is no immunization history on file for this patient.  Social History     Tobacco Use    Smoking status: Former     Types: Cigarettes    Smokeless tobacco: Never   Substance Use Topics    Alcohol use: Yes     Comment: a pint of vodka three times a week      Social History     Substance and Sexual Activity   Drug Use Yes    Types: Marijuana       VITALS: /91 (BP Location: Left arm, Patient Position: Sitting)   Pulse 68   Temp 98.5 °F (36.9 °C) (Oral)   Resp 16   Ht 185.4 cm (73\")   Wt 112 kg (247 lb)   SpO2 100%   BMI 32.59 kg/m²  Body mass index is 32.59 kg/m².    PHYSICAL EXAM:   CONSTITUTIONAL: No acute distress  LUNGS: Equal chest rise, no shortness of air  CARDIOVASCULAR: palpable peripheral pulses  EXTREMITY:   Right upper extremity  Some redness over the elbow and posterior proximal forearm.  No palpable fluctuance or fluid collection.  Elbow range of motion about 15 to 100 degrees flexion.  No pain with mid arc range of motion.  No obvious signs of septic arthritis.  Pulses:  Brisk Capillary Refill  Sensation: Intact to Radial, Median, Ulnar Nerves and grossly throughout extremity  Motor: 5/5 Radial/Ulnar/Median/AIN/PIN Motor Nerves        RADIOLOGY REVIEW:   XR Elbow 2 View Right    Result Date: 7/13/2024   As described.    This report was finalized on 7/13/2024 10:50 AM by Dr. eBrt Galloway M.D on Workstation: [id number]      XR Elbow 2 View Right    Result Date: 7/9/2024  No acute osseous findings.  This report was finalized on 7/9/2024 10:12 PM by Dr. Tahira Lee M.D on Workstation: [id number]       LABS:   Results for the past 24 hours:   Recent Results (from " the past 24 hour(s))   POC Glucose Once    Collection Time: 07/14/24  9:02 PM    Specimen: Blood   Result Value Ref Range    Glucose 131 (H) 70 - 130 mg/dL   Comprehensive Metabolic Panel    Collection Time: 07/15/24  6:13 AM    Specimen: Blood   Result Value Ref Range    Glucose 191 (H) 65 - 99 mg/dL    BUN 12 6 - 20 mg/dL    Creatinine 0.59 (L) 0.76 - 1.27 mg/dL    Sodium 138 136 - 145 mmol/L    Potassium 4.0 3.5 - 5.2 mmol/L    Chloride 104 98 - 107 mmol/L    CO2 23.1 22.0 - 29.0 mmol/L    Calcium 8.3 (L) 8.6 - 10.5 mg/dL    Total Protein 6.2 6.0 - 8.5 g/dL    Albumin 3.3 (L) 3.5 - 5.2 g/dL    ALT (SGPT) 10 1 - 41 U/L    AST (SGOT) 9 1 - 40 U/L    Alkaline Phosphatase 65 39 - 117 U/L    Total Bilirubin 0.4 0.0 - 1.2 mg/dL    Globulin 2.9 gm/dL    A/G Ratio 1.1 g/dL    BUN/Creatinine Ratio 20.3 7.0 - 25.0    Anion Gap 10.9 5.0 - 15.0 mmol/L    eGFR 120.4 >60.0 mL/min/1.73   CBC (No Diff)    Collection Time: 07/15/24  6:13 AM    Specimen: Blood   Result Value Ref Range    WBC 5.97 3.40 - 10.80 10*3/mm3    RBC 3.88 (L) 4.14 - 5.80 10*6/mm3    Hemoglobin 12.1 (L) 13.0 - 17.7 g/dL    Hematocrit 35.5 (L) 37.5 - 51.0 %    MCV 91.5 79.0 - 97.0 fL    MCH 31.2 26.6 - 33.0 pg    MCHC 34.1 31.5 - 35.7 g/dL    RDW 11.7 (L) 12.3 - 15.4 %    RDW-SD 38.4 37.0 - 54.0 fl    MPV 9.3 6.0 - 12.0 fL    Platelets 204 140 - 450 10*3/mm3   Magnesium    Collection Time: 07/15/24  6:13 AM    Specimen: Blood   Result Value Ref Range    Magnesium 2.3 1.6 - 2.6 mg/dL   Phosphorus    Collection Time: 07/15/24  6:13 AM    Specimen: Blood   Result Value Ref Range    Phosphorus 3.6 2.5 - 4.5 mg/dL   C-reactive Protein    Collection Time: 07/15/24  6:13 AM    Specimen: Blood   Result Value Ref Range    C-Reactive Protein 3.33 (H) 0.00 - 0.50 mg/dL   Uric Acid    Collection Time: 07/15/24  6:13 AM    Specimen: Blood   Result Value Ref Range    Uric Acid 3.0 (L) 3.4 - 7.0 mg/dL   POC Glucose Once    Collection Time: 07/15/24  8:08 AM    Specimen:  "Blood   Result Value Ref Range    Glucose 156 (H) 70 - 130 mg/dL   Vancomycin, Trough \"Vancomycin dose due at 7/15 1100. Please make sure Vancomycin IV is not infusing before drawing the vancomycin level.\" Hold vancomycin dose if level is >21 mcg/mL.    Collection Time: 07/15/24 10:42 AM    Specimen: Blood   Result Value Ref Range    Vancomycin Trough 6.30 5.00 - 20.00 mcg/mL   POC Glucose Once    Collection Time: 07/15/24 11:33 AM    Specimen: Blood   Result Value Ref Range    Glucose 145 (H) 70 - 130 mg/dL   POC Glucose Once    Collection Time: 07/15/24  4:41 PM    Specimen: Blood   Result Value Ref Range    Glucose 134 (H) 70 - 130 mg/dL       IMPRESSION:  Patient is a 48 y.o. male with right elbow and forearm cellulitis    PLAN:   Admited to: Car Motley MD  And is mainly cellulitis.  Really do not feel any palpable fluctuance, or large olecranon bursitis.  Do not see anything that we need intervention surgically at this time.  He has been switched over to dicloxacillin and Bactrim by infectious disease, and responding well.  They recommend 12 more days.  Patient feels like you are to be discharged.  From orthopedic surgery standpoint I will be fine with him being discharged.  As I do not see any real olecranon bursitis fluid collection this time or nothing of any surgery, from orthopedics I think can follow-up on an as-needed basis.  I am happy to see him for follow-up in the office if is not resolved or olecranon bursitis itself gets worse, but even if there is septic olecranon bursitis, we tend to treat that with antibiotics and try to treated conservatively without operative intervention if possible.    Rojelio Denson MD  [facility]  Orthopaedic Surgery, Sports Medicine  [phone]                 "

## 2024-07-15 NOTE — CONSULTS
"Patient states that he has already completed advance directive and had it notarized. He is going through a \"nasty divorce\" and doesn't want his soon to be ex wife to have any decision making abilities.   "

## 2024-07-15 NOTE — PAYOR COMM NOTE
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 sensation, seizures, syncope, changes in memory.  Endocrine system: Patient denies tremors, palpitations, intolerance of heat or cold, polyuria, polydipsia, polyphagia, diaphoresis, exophthalmos, or goiter.  Psychological: Patient denies thoughts/plans of harming self or other; depression,  insomnia,  night terrors, hali, memory loss, disorientation.  Skin: Patient denies any bruising, rashes, discoloration, pruritus, wounds, ulcers, decubiti, changes in the hair or nails.  Hematopoietic: Patient denies history of spontaneous or excessive bleeding, epistaxis, hematuria, melena, fatigue, enlarged or tender lymph nodes, pallor, history of anemia.    Physical Exam: 48 y.o. male  General Appearance:    Alert, cooperative, in no acute distress                   Vitals:    07/13/24 1422 07/13/24 2053 07/14/24 0042 07/14/24 0449   BP: 120/68 132/81 123/75 106/66   BP Location: Right arm Right arm Left arm Left arm   Patient Position: Lying Sitting Lying Lying   Pulse: 63 67 63 60   Resp: 18 18 18 18   Temp: 97.1 °F (36.2 °C) 98 °F (36.7 °C) 97.4 °F (36.3 °C) 97.5 °F (36.4 °C)   TempSrc: Oral Oral Oral Oral   SpO2: 96% 96% 98% 96%   Weight:       Height:            Head:  Normocephalic, without obvious abnormality, atraumatic   Eyes:          Lids and lashes normal, conjunctivae and sclerae normal, no icterus, no pallor, corneas clear, PERRLA   Ears:  Ears appear intact with no abnormalities noted   Throat: No oral lesions, no thrush, oral mucosa moist   Neck: No adenopathy, supple, trachea midline, no thyromegaly, no carotid bruit, no JVD   Back:   No kyphosis present, no scoliosis present, no skin lesions,    erythema or scars, no tenderness to percussion or                   palpation, range of motion normal   Lungs:   Clear to auscultation, respirations regular, even and                unlabored    Heart:  Regular rhythm and normal rate, normal S1 and S2, no         murmur, no gallop, no rub, no click   Chest Wall:  No abnormalities observed   Abdomen:   Normal bowel sounds, no masses, no organomegaly, soft     nontender, nondistended, no guarding, no rebound                tenderness   Rectal:   Deferred   Extremities: Tenderness noted at right olecranon bursa with moderate swelling and erythema.  I do not  appreciate any fluctuance or obvious fluid collection.  I am able to move his wrist and fingers with full passive stretch without pain through the forearm wrist or hand.  There is still mild swelling about the forearm and hand but it is nontender to palpation.  I can move his elbow through mid range of motion without any discomfort.  He does have posterior elbow pain with flexion past 110 degrees and all of the pain seems to be localized about the olecranon bursa.  He does not have any tenderness to palpation about the anterior elbow or anterior elbow pain with flexion or extension.    Pulses: Pulses palpable and equal bilaterally   Skin: No bleeding, bruising or rash   Lymph nodes: No palpable adenopathy   Neurologic: Cranial nerves 2 - 12 grossly intact, sensation intact, DTR     present and equal bilaterally           Diagnostic Tests:    Admission on 07/13/2024   Component Date Value Ref Range Status    Glucose 07/13/2024 293 (H)  65 - 99 mg/dL Final    BUN 07/13/2024 11  6 - 20 mg/dL Final    Creatinine 07/13/2024 0.74 (L)  0.76 - 1.27 mg/dL Final    Sodium 07/13/2024 135 (L)  136 - 145 mmol/L Final    Potassium 07/13/2024 4.0  3.5 - 5.2 mmol/L Final    Chloride 07/13/2024 102  98 - 107 mmol/L Final    CO2 07/13/2024 23.3  22.0 - 29.0 mmol/L Final    Calcium 07/13/2024 9.0  8.6 - 10.5 mg/dL Final    BUN/Creatinine Ratio 07/13/2024 14.9  7.0 - 25.0 Final    Anion Gap 07/13/2024 9.7  5.0 - 15.0 mmol/L Final    eGFR 07/13/2024 112.5  >60.0 mL/min/1.73 Final    D-Dimer, Quantitative 07/13/2024 0.29  0.00 - 0.50 MCGFEU/mL Final    Lactate 07/13/2024 2.2 (C)  0.5 - 2.0 mmol/L Final    Procalcitonin 07/13/2024 0.07  0.00 - 0.25 ng/mL Final    C-Reactive Protein 07/13/2024 8.68 (H)  0.00 - 0.50 mg/dL Final    Sed Rate 07/13/2024 44 (H)  0 - 15 mm/hr Final    Magnesium 07/13/2024 2.0  1.6 - 2.6 mg/dL Final    WBC 07/13/2024 8.03  3.40 - 10.80 10*3/mm3 Final    RBC 07/13/2024 4.18  4.14 - 5.80 10*6/mm3 Final     Hemoglobin 07/13/2024 13.2  13.0 - 17.7 g/dL Final    Hematocrit 07/13/2024 39.2  37.5 - 51.0 % Final    MCV 07/13/2024 93.8  79.0 - 97.0 fL Final    MCH 07/13/2024 31.6  26.6 - 33.0 pg Final    MCHC 07/13/2024 33.7  31.5 - 35.7 g/dL Final    RDW 07/13/2024 11.9 (L)  12.3 - 15.4 % Final    RDW-SD 07/13/2024 41.2  37.0 - 54.0 fl Final    MPV 07/13/2024 8.9  6.0 - 12.0 fL Final    Platelets 07/13/2024 214  140 - 450 10*3/mm3 Final    Neutrophil % 07/13/2024 80.3 (H)  42.7 - 76.0 % Final    Lymphocyte % 07/13/2024 10.1 (L)  19.6 - 45.3 % Final    Monocyte % 07/13/2024 6.5  5.0 - 12.0 % Final    Eosinophil % 07/13/2024 2.5  0.3 - 6.2 % Final    Basophil % 07/13/2024 0.4  0.0 - 1.5 % Final    Immature Grans % 07/13/2024 0.2  0.0 - 0.5 % Final    Neutrophils, Absolute 07/13/2024 6.45  1.70 - 7.00 10*3/mm3 Final    Lymphocytes, Absolute 07/13/2024 0.81  0.70 - 3.10 10*3/mm3 Final    Monocytes, Absolute 07/13/2024 0.52  0.10 - 0.90 10*3/mm3 Final    Eosinophils, Absolute 07/13/2024 0.20  0.00 - 0.40 10*3/mm3 Final    Basophils, Absolute 07/13/2024 0.03  0.00 - 0.20 10*3/mm3 Final    Immature Grans, Absolute 07/13/2024 0.02  0.00 - 0.05 10*3/mm3 Final    nRBC 07/13/2024 0.0  0.0 - 0.2 /100 WBC Final    Lactate 07/13/2024 1.2  0.5 - 2.0 mmol/L Final    Hemoglobin A1C 07/13/2024 6.50 (H)  4.80 - 5.60 % Final    Glucose 07/13/2024 192 (H)  70 - 130 mg/dL Final    Glucose 07/13/2024 142 (H)  70 - 130 mg/dL Final    Glucose 07/14/2024 208 (H)  65 - 99 mg/dL Final    BUN 07/14/2024 11  6 - 20 mg/dL Final    Creatinine 07/14/2024 0.58 (L)  0.76 - 1.27 mg/dL Final    Sodium 07/14/2024 139  136 - 145 mmol/L Final    Potassium 07/14/2024 3.9  3.5 - 5.2 mmol/L Final    Chloride 07/14/2024 105  98 - 107 mmol/L Final    CO2 07/14/2024 23.0  22.0 - 29.0 mmol/L Final    Calcium 07/14/2024 8.4 (L)  8.6 - 10.5 mg/dL Final    BUN/Creatinine Ratio 07/14/2024 19.0  7.0 - 25.0 Final    Anion Gap 07/14/2024 11.0  5.0 - 15.0 mmol/L Final    eGFR  07/14/2024 121.1  >60.0 mL/min/1.73 Final    WBC 07/14/2024 5.51  3.40 - 10.80 10*3/mm3 Final    RBC 07/14/2024 3.81 (L)  4.14 - 5.80 10*6/mm3 Final    Hemoglobin 07/14/2024 11.8 (L)  13.0 - 17.7 g/dL Final    Hematocrit 07/14/2024 34.8 (L)  37.5 - 51.0 % Final    MCV 07/14/2024 91.3  79.0 - 97.0 fL Final    MCH 07/14/2024 31.0  26.6 - 33.0 pg Final    MCHC 07/14/2024 33.9  31.5 - 35.7 g/dL Final    RDW 07/14/2024 11.8 (L)  12.3 - 15.4 % Final    RDW-SD 07/14/2024 39.2  37.0 - 54.0 fl Final    MPV 07/14/2024 9.4  6.0 - 12.0 fL Final    Platelets 07/14/2024 192  140 - 450 10*3/mm3 Final    Neutrophil % 07/14/2024 68.9  42.7 - 76.0 % Final    Lymphocyte % 07/14/2024 18.0 (L)  19.6 - 45.3 % Final    Monocyte % 07/14/2024 8.3  5.0 - 12.0 % Final    Eosinophil % 07/14/2024 4.0  0.3 - 6.2 % Final    Basophil % 07/14/2024 0.4  0.0 - 1.5 % Final    Immature Grans % 07/14/2024 0.4  0.0 - 0.5 % Final    Neutrophils, Absolute 07/14/2024 3.80  1.70 - 7.00 10*3/mm3 Final    Lymphocytes, Absolute 07/14/2024 0.99  0.70 - 3.10 10*3/mm3 Final    Monocytes, Absolute 07/14/2024 0.46  0.10 - 0.90 10*3/mm3 Final    Eosinophils, Absolute 07/14/2024 0.22  0.00 - 0.40 10*3/mm3 Final    Basophils, Absolute 07/14/2024 0.02  0.00 - 0.20 10*3/mm3 Final    Immature Grans, Absolute 07/14/2024 0.02  0.00 - 0.05 10*3/mm3 Final    nRBC 07/14/2024 0.0  0.0 - 0.2 /100 WBC Final    TSH 07/14/2024 2.690  0.270 - 4.200 uIU/mL Final    Glucose 07/14/2024 165 (H)  70 - 130 mg/dL Final       No results found.      Assessment:  Right elbow infectious olecranon bursitis and surrounding cellulitis.  I see no drainable fluid collection currently and have a low suspicion of intra-articular infection or of infection tracking down the forearm tendons.  At this point he does appear to be responding to IV antibiotics so I recommend continued medical management of this process.  I am seeing this patient for Dr. Denson today he will return tomorrow to reassess.            Plan:  The patient voiced understanding of the risks, benefits, and alternative forms of treatment that were discussed and the patient consents to proceed with medical management of right elbow infectious bursitis/cellulitis.  He will need continued observation over the next day or 2 to confirm continued improvement.            Date: 7/14/2024    Juan Perez MD        Electronically signed by Juan Perez MD at 07/14/24 0828

## 2024-07-15 NOTE — PROGRESS NOTES
Discharge Planning Assessment  [facility]     Patient Name: Jignesh Kaur  MRN: [id number]  Today's Date: 7/15/2024    Admit Date: 7/13/2024    Plan: Home   Discharge Needs Assessment       Row Name 07/15/24 1328       Living Environment    People in Home alone    Current Living Arrangements home    Potentially Unsafe Housing Conditions none    Primary Care Provided by self    Provides Primary Care For no one    Family Caregiver if Needed friend(s)    Quality of Family Relationships supportive    Able to Return to Prior Arrangements yes       Transition Planning    Patient/Family Anticipates Transition to home    Patient/Family Anticipated Services at Transition none    Transportation Anticipated family or friend will provide       Discharge Needs Assessment    Equipment Currently Used at Home none                   Discharge Plan       Row Name 07/15/24 1329       Plan    Plan Home    Plan Comments Soke with pt to screen for DCP/needs.  Pt does plan to return home at [place] with support from friends if needed.  Pt reports beign totally independent with all self care and mobility.  Pt stated that he plans touse  pharmacy meds to bed at [place].  pt does have transportation  home at [place]   pt does deny an DC needs.                  Continued Care and Services - Admitted Since 7/13/2024    No active coordination exists for this encounter.       Expected Discharge Date and Time       Expected Discharge Date Expected Discharge Time    Jul 16, 2024            Demographic Summary       Row Name 07/15/24 1327       General Information    Admission Type inpatient    Arrived From home    Referral Source admission list    Reason for Consult discharge planning    Preferred Language English                   Functional Status       Row Name 07/15/24 1327       Functional Status    Usual Activity Tolerance excellent    Current Activity Tolerance excellent       Functional Status, IADL    Medications independent    Meal Preparation  independent    Laundry independent    Shopping independent       Mental Status Summary    Recent Changes in Mental Status/Cognitive Functioning no changes                   Psychosocial    No documentation.                  Abuse/Neglect    No documentation.                  Legal    No documentation.                  Substance Abuse    No documentation.                  Patient Forms    No documentation.                     Myra Grady MSW
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
## 2024-07-16 ENCOUNTER — READMISSION MANAGEMENT (OUTPATIENT)
Dept: CALL CENTER | Facility: HOSPITAL | Age: 49
End: 2024-07-16
Payer: COMMERCIAL

## 2024-07-16 NOTE — OUTREACH NOTE
Prep Survey      Flowsheet Row Responses   [place] facility patient discharged from? [place]   Is LACE score < 7 ? No   Eligibility Readm Mgmt   Discharge diagnosis Septic olecranon bursitis of right elbow   Does the patient have one of the following disease processes/diagnoses(primary or secondary)? Other   Does the patient have Home health ordered? No   Is there a DME ordered? No   Medication alerts for this patient see avs--po antibx   Prep survey completed? Yes            Barb NEWSOME - Registered Nurse

## 2024-07-16 NOTE — PROGRESS NOTES
Case Management Discharge Note      Final Note: Discharged to home on 7/15/24. CHALO lEias RN, CCP.         Selected Continued Care - Discharged on 7/15/2024 Admission date: 7/13/2024 - Discharge disposition: Home or Self Care      Destination    No services have been selected for the patient.                Durable Medical Equipment    No services have been selected for the patient.                Dialysis/Infusion    No services have been selected for the patient.                Home Medical Care    No services have been selected for the patient.                Therapy    No services have been selected for the patient.                Community Resources    No services have been selected for the patient.                Community & DME    No services have been selected for the patient.                    Transportation Services  Private: Car    Final Discharge Disposition Code: 01 - home or self-care

## 2024-07-16 NOTE — PAYOR COMM NOTE
"Jignesh Schmidt (48 y.o. Male)          DE SUMMARY FOR [id number]     CONTACT JAVON NATHAN  F# [phone]         Date of Birth   1975    Social Security Number       Address   [address] [place] [address]    Home Phone   [phone]    MRN   [id number]       Lutheran   Mu-ism    Marital Status                               Admission Date   7/13/24    Admission Type   Emergency    Admitting Provider   Car Motley MD    Attending Provider       Department, Room/Bed   [address], P497/1       Discharge Date   7/15/2024    Discharge Disposition   Home or Self Care    Discharge Destination                                 Attending Provider: (none)   Allergies: No Known Allergies    Isolation: None   Infection: None   Code Status: Prior    Ht: 185.4 cm (73\")   Wt: 112 kg (247 lb)    Admission Cmt: None   Principal Problem: Septic olecranon bursitis of right elbow [M71.121]                   Active Insurance as of 7/13/2024       Primary Coverage       Payor Plan Insurance Group Employer/Plan Group    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          Consult Notes (last 24 hours)        Rojelio Denson MD at 07/15/24 3056                Orthopaedic & Sports Medicine Surgery  Dr. Rojelio Denson MD  [phone]    Orthopaedic Surgery  Consult Note      HPI:  Patient is a 48 y.o. male who presents with worsening right elbow and " forearm cellulitis.  I saw him last week and he had some elbow cellulitis.  He was discharged on Keflex.  Over the next few days this got worse and he presented back to [facility].  Dr. Perez saw him on consult over the weekend and asked that I take over today.  He has been switched to dicloxacillin and Bactrim and responding well.    MEDICAL HISTORY  Past Medical History:   Diagnosis Date    Diabetes mellitus     Elevated cholesterol      Past Surgical History:   Procedure Laterality Date    REPLACEMENT TOTAL KNEE Left 2022     Prior to Admission medications    Medication Sig Start Date End Date Taking? Authorizing Provider   atorvastatin (LIPITOR) 20 MG tablet Take 1 tablet by mouth Daily. 6/6/24  Yes Zion Birmingham MD   Canagliflozin (INVOKANA) 100 MG tablet tablet Take 1 tablet by mouth Daily.   Yes Zion Birmingham MD   cephalexin (KEFLEX) 500 MG capsule Take 1 capsule by mouth 3 (Three) Times a Day for 7 days. 7/10/24 7/17/24 Yes Patria Grande APRN   dicloxacillin (DYNAPEN) 500 MG capsule Take 1 capsule by mouth 4 (Four) Times a Day for 12 days. Indications: Infection Under the Skin 7/15/24 7/27/24 Yes Jesus Manuel Negrete MD   HYDROcodone-acetaminophen (NORCO) 5-325 MG per tablet Take 1 tablet by mouth Every 6 (Six) Hours As Needed for Moderate Pain for up to 5 days. 7/10/24 7/15/24 Yes Patria Grande APRN   metFORMIN (GLUCOPHAGE) 1000 MG tablet Take 1 tablet by mouth 2 (Two) Times a Day With Meals. 6/27/24  Yes Zion Birmingham MD   oxyCODONE-acetaminophen (PERCOCET) 5-325 MG per tablet Take 1 tablet by mouth Every 6 (Six) Hours As Needed for Moderate Pain. 7/15/24  Yes Jesus Manuel Negrete MD   sulfamethoxazole-trimethoprim (BACTRIM DS,SEPTRA DS) 800-160 MG per tablet Take 1 tablet by mouth Every 12 (Twelve) Hours for 23 doses. Indications: Infection of the Skin and/or Soft Tissue 7/15/24 7/27/24 Yes Jesus Manuel Negrete MD   ondansetron ODT (ZOFRAN-ODT) 4 MG disintegrating tablet Place 1 tablet on  "the tongue Every 8 (Eight) Hours As Needed for Nausea or Vomiting. 7/10/24   Patria Grande APRN     No Known Allergies    There is no immunization history on file for this patient.  Social History     Tobacco Use    Smoking status: Former     Types: Cigarettes    Smokeless tobacco: Never   Substance Use Topics    Alcohol use: Yes     Comment: a pint of vodka three times a week      Social History     Substance and Sexual Activity   Drug Use Yes    Types: Marijuana       VITALS: /91 (BP Location: Left arm, Patient Position: Sitting)   Pulse 68   Temp 98.5 °F (36.9 °C) (Oral)   Resp 16   Ht 185.4 cm (73\")   Wt 112 kg (247 lb)   SpO2 100%   BMI 32.59 kg/m²  Body mass index is 32.59 kg/m².    PHYSICAL EXAM:   CONSTITUTIONAL: No acute distress  LUNGS: Equal chest rise, no shortness of air  CARDIOVASCULAR: palpable peripheral pulses  EXTREMITY:   Right upper extremity  Some redness over the elbow and posterior proximal forearm.  No palpable fluctuance or fluid collection.  Elbow range of motion about 15 to 100 degrees flexion.  No pain with mid arc range of motion.  No obvious signs of septic arthritis.  Pulses:  Brisk Capillary Refill  Sensation: Intact to Radial, Median, Ulnar Nerves and grossly throughout extremity  Motor: 5/5 Radial/Ulnar/Median/AIN/PIN Motor Nerves        RADIOLOGY REVIEW:   XR Elbow 2 View Right    Result Date: 7/13/2024   As described.    This report was finalized on 7/13/2024 10:50 AM by Dr. Bert Galloway M.D on Workstation: [id number]      XR Elbow 2 View Right    Result Date: 7/9/2024  No acute osseous findings.  This report was finalized on 7/9/2024 10:12 PM by Dr. Tahira Lee M.D on Workstation: [id number]       LABS:   Results for the past 24 hours:   Recent Results (from the past 24 hour(s))   POC Glucose Once    Collection Time: 07/14/24  9:02 PM    Specimen: Blood   Result Value Ref Range    Glucose 131 (H) 70 - 130 mg/dL   Comprehensive Metabolic Panel    " "Collection Time: 07/15/24  6:13 AM    Specimen: Blood   Result Value Ref Range    Glucose 191 (H) 65 - 99 mg/dL    BUN 12 6 - 20 mg/dL    Creatinine 0.59 (L) 0.76 - 1.27 mg/dL    Sodium 138 136 - 145 mmol/L    Potassium 4.0 3.5 - 5.2 mmol/L    Chloride 104 98 - 107 mmol/L    CO2 23.1 22.0 - 29.0 mmol/L    Calcium 8.3 (L) 8.6 - 10.5 mg/dL    Total Protein 6.2 6.0 - 8.5 g/dL    Albumin 3.3 (L) 3.5 - 5.2 g/dL    ALT (SGPT) 10 1 - 41 U/L    AST (SGOT) 9 1 - 40 U/L    Alkaline Phosphatase 65 39 - 117 U/L    Total Bilirubin 0.4 0.0 - 1.2 mg/dL    Globulin 2.9 gm/dL    A/G Ratio 1.1 g/dL    BUN/Creatinine Ratio 20.3 7.0 - 25.0    Anion Gap 10.9 5.0 - 15.0 mmol/L    eGFR 120.4 >60.0 mL/min/1.73   CBC (No Diff)    Collection Time: 07/15/24  6:13 AM    Specimen: Blood   Result Value Ref Range    WBC 5.97 3.40 - 10.80 10*3/mm3    RBC 3.88 (L) 4.14 - 5.80 10*6/mm3    Hemoglobin 12.1 (L) 13.0 - 17.7 g/dL    Hematocrit 35.5 (L) 37.5 - 51.0 %    MCV 91.5 79.0 - 97.0 fL    MCH 31.2 26.6 - 33.0 pg    MCHC 34.1 31.5 - 35.7 g/dL    RDW 11.7 (L) 12.3 - 15.4 %    RDW-SD 38.4 37.0 - 54.0 fl    MPV 9.3 6.0 - 12.0 fL    Platelets 204 140 - 450 10*3/mm3   Magnesium    Collection Time: 07/15/24  6:13 AM    Specimen: Blood   Result Value Ref Range    Magnesium 2.3 1.6 - 2.6 mg/dL   Phosphorus    Collection Time: 07/15/24  6:13 AM    Specimen: Blood   Result Value Ref Range    Phosphorus 3.6 2.5 - 4.5 mg/dL   C-reactive Protein    Collection Time: 07/15/24  6:13 AM    Specimen: Blood   Result Value Ref Range    C-Reactive Protein 3.33 (H) 0.00 - 0.50 mg/dL   Uric Acid    Collection Time: 07/15/24  6:13 AM    Specimen: Blood   Result Value Ref Range    Uric Acid 3.0 (L) 3.4 - 7.0 mg/dL   POC Glucose Once    Collection Time: 07/15/24  8:08 AM    Specimen: Blood   Result Value Ref Range    Glucose 156 (H) 70 - 130 mg/dL   Vancomycin, Trough \"Vancomycin dose due at 7/15 1100. Please make sure Vancomycin IV is not infusing before drawing the " "vancomycin level.\" Hold vancomycin dose if level is >21 mcg/mL.    Collection Time: 07/15/24 10:42 AM    Specimen: Blood   Result Value Ref Range    Vancomycin Trough 6.30 5.00 - 20.00 mcg/mL   POC Glucose Once    Collection Time: 07/15/24 11:33 AM    Specimen: Blood   Result Value Ref Range    Glucose 145 (H) 70 - 130 mg/dL   POC Glucose Once    Collection Time: 07/15/24  4:41 PM    Specimen: Blood   Result Value Ref Range    Glucose 134 (H) 70 - 130 mg/dL       IMPRESSION:  Patient is a 48 y.o. male with right elbow and forearm cellulitis    PLAN:   Admited to: Car Motley MD  And is mainly cellulitis.  Really do not feel any palpable fluctuance, or large olecranon bursitis.  Do not see anything that we need intervention surgically at this time.  He has been switched over to dicloxacillin and Bactrim by infectious disease, and responding well.  They recommend 12 more days.  Patient feels like you are to be discharged.  From orthopedic surgery standpoint I will be fine with him being discharged.  As I do not see any real olecranon bursitis fluid collection this time or nothing of any surgery, from orthopedics I think can follow-up on an as-needed basis.  I am happy to see him for follow-up in the office if is not resolved or olecranon bursitis itself gets worse, but even if there is septic olecranon bursitis, we tend to treat that with antibiotics and try to treated conservatively without operative intervention if possible.    Rojelio Denson MD  [facility]  Orthopaedic Surgery, Sports Medicine  [phone]                   Electronically signed by Rojelio Denson MD at 07/15/24 3987       Discharge Summary    No notes of this type exist for this encounter.       "

## 2024-07-18 LAB
BACTERIA SPEC AEROBE CULT: NORMAL
BACTERIA SPEC AEROBE CULT: NORMAL

## 2024-07-26 ENCOUNTER — READMISSION MANAGEMENT (OUTPATIENT)
Dept: CALL CENTER | Facility: HOSPITAL | Age: 49
End: 2024-07-26
Payer: COMMERCIAL

## 2024-07-26 NOTE — OUTREACH NOTE
Medical Week 2 Survey      Flowsheet Row Responses   [facility] patient discharged from? [facility]   Does the patient have one of the following disease processes/diagnoses(primary or secondary)? Other   Week 2 attempt successful? Yes   Call start time 1031   Discharge diagnosis Septic olecranon bursitis of right elbow   Call end time 1033   Meds reviewed with patient/caregiver? Yes   Is the patient taking all medications as directed (includes completed medication regime)? Yes   Does the patient have a primary care provider?  Yes   Does the patient have an appointment with their PCP within 7 days of discharge? No   What is preventing the patient from scheduling follow up appointments within 7 days of discharge? Haven't had time   Nursing Interventions Educated patient on importance of making appointment, Advised patient to make appointment   Has the patient kept scheduled appointments due by today? N/A   Has home health visited the patient within 72 hours of discharge? N/A   Psychosocial issues? No   Did the patient receive a copy of their discharge instructions? Yes   Nursing interventions Reviewed instructions with patient   What is the patient's perception of their health status since discharge? Improving   Is the patient/caregiver able to teach back signs and symptoms related to disease process for when to call PCP? Yes   Is the patient/caregiver able to teach back signs and symptoms related to disease process for when to call 911? Yes   Is the patient/caregiver able to teach back the hierarchy of who to call/visit for symptoms/problems? PCP, Specialist, Home health nurse, Urgent Care, ED, 911 Yes   Week 2 Call Completed? Yes   Graduated Yes   Graduated/Revoked comments Pt reports he is doing much better and will make FU appt with PCP. Reviewed s/s of sepsis.   Call end time 1033            DIETER NEWSOME - Registered Nurse

## 2025-01-31 ENCOUNTER — HOSPITAL ENCOUNTER (EMERGENCY)
Facility: HOSPITAL | Age: 50
Discharge: HOME | End: 2025-01-31
Attending: EMERGENCY MEDICINE
Payer: COMMERCIAL

## 2025-01-31 VITALS
HEART RATE: 91 BPM | DIASTOLIC BLOOD PRESSURE: 87 MMHG | BODY MASS INDEX: 37.11 KG/M2 | OXYGEN SATURATION: 99 % | RESPIRATION RATE: 14 BRPM | TEMPERATURE: 98 F | HEIGHT: 73 IN | SYSTOLIC BLOOD PRESSURE: 151 MMHG | WEIGHT: 280 LBS

## 2025-01-31 DIAGNOSIS — L03.019 PARONYCHIA OF LITTLE FINGER: Primary | ICD-10-CM

## 2025-01-31 PROCEDURE — 99282 EMERGENCY DEPT VISIT SF MDM: CPT

## 2025-01-31 RX ORDER — SULFAMETHOXAZOLE AND TRIMETHOPRIM 800; 160 MG/1; MG/1
1 TABLET ORAL 2 TIMES DAILY
Qty: 14 TABLET | Refills: 0 | Status: SHIPPED | OUTPATIENT
Start: 2025-01-31 | End: 2025-02-07

## 2025-02-01 NOTE — ED PROVIDER NOTES
EMERGENCY DEPARTMENT ENCOUNTER  Room Number:  36/36  PCP: Hilaria Velazquez APRN  Independent Historians: Patient and Family      HPI:  Chief Complaint: had concerns including Finger Injury.     A complete HPI/ROS/PMH/PSH/SH/FH are unobtainable due to: None    Chronic or social conditions impacting patient care (Social Determinants of Health): None      Context: Jignesh Kaur is a 49 y.o. male with a medical history of obesity, cellulitis who presents to the ED c/o acute finger pain.  The patient reports right fifth digit pain.  He reports it has been red and swollen.  He tried to drain things out the vision got a little bit of yellow and green drainage.  He denies fevers.  He is a diabetic.  He reports he has had cellulitis in the past.      Review of prior external notes (non-ED) -and- Review of prior external test results outside of this encounter:  Laboratory evaluation 7/15/2024 shows normal CMP, normal CBC, elevated CRP    Prescription drug monitoring program review:         PAST MEDICAL HISTORY  Active Ambulatory Problems     Diagnosis Date Noted    Left elbow pain 07/10/2024    Septic olecranon bursitis of right elbow 07/14/2024    Type 2 diabetes mellitus, without long-term current use of insulin 07/14/2024    HLD (hyperlipidemia) 07/14/2024     Resolved Ambulatory Problems     Diagnosis Date Noted    No Resolved Ambulatory Problems     Past Medical History:   Diagnosis Date    Diabetes mellitus     Elevated cholesterol          PAST SURGICAL HISTORY  Past Surgical History:   Procedure Laterality Date    REPLACEMENT TOTAL KNEE Left 2022         FAMILY HISTORY  Family History   Problem Relation Age of Onset    Heart disease Father     Diabetes Father          SOCIAL HISTORY  Social History     Socioeconomic History    Marital status:    Tobacco Use    Smoking status: Former     Types: Cigarettes    Smokeless tobacco: Never   Vaping Use    Vaping status: Never Used   Substance and Sexual Activity     Alcohol use: Yes     Comment: a pint of vodka three times a week    Drug use: Yes     Types: Marijuana    Sexual activity: Defer         ALLERGIES  Patient has no known allergies.      REVIEW OF SYSTEMS  Review of Systems  Included in HPI  All systems reviewed and negative except for those discussed in HPI.      PHYSICAL EXAM    I have reviewed the triage vital signs and nursing notes.    ED Triage Vitals   Temp Heart Rate Resp BP SpO2   01/31/25 1903 01/31/25 1903 01/31/25 1903 01/31/25 1927 01/31/25 1903   98 °F (36.7 °C) 91 14 151/87 99 %      Temp src Heart Rate Source Patient Position BP Location FiO2 (%)   01/31/25 1903 01/31/25 1903 -- -- --   Tympanic Monitor          Physical Exam  GENERAL: Awake, alert, no acute distress  SKIN: Warm, dry  HENT: Normocephalic, atraumatic  EYES: no scleral icterus  CV: regular rhythm, regular rate  RESPIRATORY: normal effort, lungs clear  ABDOMEN: soft, nontender, nondistended  MUSCULOSKELETAL: no deformity.  Right fifth digit with medial swelling and erythema consistent with a early paronychia.  No drainage.  No palpable abscess.  Cap refill intact.  No streaking up the hand  NEURO: alert, moves all extremities, follows commands            LAB RESULTS  No results found for this or any previous visit (from the past 24 hours).      RADIOLOGY  No Radiology Exams Resulted Within Past 24 Hours      MEDICATIONS GIVEN IN ER  Medications - No data to display      ORDERS PLACED DURING THIS VISIT:  Orders Placed This Encounter   Procedures    Incision & Drainage         OUTPATIENT MEDICATION MANAGEMENT:  No current Epic-ordered facility-administered medications on file.     Current Outpatient Medications Ordered in Epic   Medication Sig Dispense Refill    atorvastatin (LIPITOR) 20 MG tablet Take 1 tablet by mouth Daily.      Canagliflozin (INVOKANA) 100 MG tablet tablet Take 1 tablet by mouth Daily.      metFORMIN (GLUCOPHAGE) 1000 MG tablet Take 1 tablet by mouth 2 (Two) Times a  Day With Meals.      ondansetron ODT (ZOFRAN-ODT) 4 MG disintegrating tablet Place 1 tablet on the tongue Every 8 (Eight) Hours As Needed for Nausea or Vomiting. 30 tablet 0    oxyCODONE-acetaminophen (PERCOCET) 5-325 MG per tablet Take 1 tablet by mouth Every 6 (Six) Hours As Needed for Moderate Pain. 12 tablet 0    sulfamethoxazole-trimethoprim (BACTRIM DS,SEPTRA DS) 800-160 MG per tablet Take 1 tablet by mouth 2 (Two) Times a Day for 7 days. 14 tablet 0         PROCEDURES  Incision & Drainage    Date/Time: 1/31/2025 7:50 PM    Performed by: Dennis Obregon MD  Authorized by: Dennis Obregon MD    Consent:     Consent obtained:  Verbal    Consent given by:  Patient    Risks discussed:  Incomplete drainage, bleeding and pain    Alternatives discussed:  No treatment  Universal protocol:     Procedure explained and questions answered to patient or proxy's satisfaction: yes    Location:     Type:  Abscess    Location:  Upper extremity    Upper extremity location:  Finger    Finger location:  R small finger  Pre-procedure details:     Skin preparation:  Chlorhexidine  Sedation:     Sedation type:  None  Anesthesia:     Anesthesia method:  None  Procedure type:     Complexity:  Simple  Procedure details:     Incision types:  Stab incision    Incision depth:  Submucosal    Drainage:  Bloody    Drainage amount:  Scant    Wound treatment:  Wound left open    Packing materials:  None  Post-procedure details:     Procedure completion:  Tolerated well, no immediate complications              PROGRESS, DATA ANALYSIS, CONSULTS, AND MEDICAL DECISION MAKING  All labs have been independently interpreted by me.  All radiology studies have been reviewed by me. All EKG's have been independently viewed and interpreted by me.  Discussion below represents my analysis of pertinent findings related to patient's condition, differential diagnosis, treatment plan and final disposition.    Differential diagnosis includes but is not limited  to cellulitis, abscess, paronychia.    Clinical Scores:                                                  AS OF 19:55 EST VITALS:    BP - 151/87  HR - 91  TEMP - 98 °F (36.7 °C) (Tympanic)  O2 SATS - 99%    COMPLEXITY OF CARE  Admission was considered but after careful review of the patient's presentation, physical examination, diagnostic results, and response to treatment the patient may be safely discharged with outpatient follow-up.      DIAGNOSIS  Final diagnoses:   Paronychia of little finger         DISPOSITION  ED Disposition       ED Disposition   Discharge    Condition   Stable    Comment   --                Please note that portions of this document were completed with a voice recognition program.    Note Disclaimer: At [facility], we believe that sharing information builds trust and better relationships. You are receiving this note because you recently visited [facility]. It is possible you will see health information before a provider has talked with you about it. This kind of information can be easy to misunderstand. To help you fully understand what it means for your health, we urge you to discuss this note with your provider.         Dennis Obregon MD  01/31/25 1955

## 2025-02-01 NOTE — ED NOTES
Pt c/o right 5th digit pain that started yesterday. Denies injury. States that he used clippers to try to cut some of his nail out. Pt reports redness, drainage and increase in pain since

## 2025-02-01 NOTE — DISCHARGE INSTRUCTIONS
Take antibiotics as prescribed.  Apply Neosporin to the wound.  Use Tylenol for pain.  Call Dr. Chen for an appointment to be seen.